# Patient Record
Sex: MALE | Race: WHITE | NOT HISPANIC OR LATINO | URBAN - METROPOLITAN AREA
[De-identification: names, ages, dates, MRNs, and addresses within clinical notes are randomized per-mention and may not be internally consistent; named-entity substitution may affect disease eponyms.]

---

## 2017-03-22 ENCOUNTER — OUTPATIENT (OUTPATIENT)
Dept: OUTPATIENT SERVICES | Facility: HOSPITAL | Age: 57
LOS: 1 days | End: 2017-03-22
Payer: MEDICARE

## 2017-03-22 DIAGNOSIS — Z98.1 ARTHRODESIS STATUS: Chronic | ICD-10-CM

## 2017-03-22 DIAGNOSIS — Z98.89 OTHER SPECIFIED POSTPROCEDURAL STATES: Chronic | ICD-10-CM

## 2017-03-22 DIAGNOSIS — R06.09 OTHER FORMS OF DYSPNEA: ICD-10-CM

## 2017-03-22 DIAGNOSIS — M54.9 DORSALGIA, UNSPECIFIED: Chronic | ICD-10-CM

## 2017-03-22 DIAGNOSIS — R68.89 OTHER GENERAL SYMPTOMS AND SIGNS: ICD-10-CM

## 2017-03-22 PROCEDURE — 78452 HT MUSCLE IMAGE SPECT MULT: CPT | Mod: 26

## 2017-03-22 PROCEDURE — A9500: CPT

## 2017-03-22 PROCEDURE — 93018 CV STRESS TEST I&R ONLY: CPT

## 2017-03-22 PROCEDURE — 93017 CV STRESS TEST TRACING ONLY: CPT

## 2017-03-22 PROCEDURE — 78452 HT MUSCLE IMAGE SPECT MULT: CPT

## 2017-03-22 PROCEDURE — 93016 CV STRESS TEST SUPVJ ONLY: CPT

## 2017-03-23 DIAGNOSIS — R06.09 OTHER FORMS OF DYSPNEA: ICD-10-CM

## 2019-10-25 ENCOUNTER — EMERGENCY (EMERGENCY)
Facility: HOSPITAL | Age: 59
LOS: 1 days | Discharge: HOME | End: 2019-10-25
Attending: EMERGENCY MEDICINE
Payer: COMMERCIAL

## 2019-10-25 VITALS
SYSTOLIC BLOOD PRESSURE: 189 MMHG | HEART RATE: 63 BPM | OXYGEN SATURATION: 98 % | DIASTOLIC BLOOD PRESSURE: 84 MMHG | RESPIRATION RATE: 18 BRPM

## 2019-10-25 VITALS
RESPIRATION RATE: 16 BRPM | HEART RATE: 77 BPM | SYSTOLIC BLOOD PRESSURE: 205 MMHG | DIASTOLIC BLOOD PRESSURE: 102 MMHG | HEIGHT: 68 IN | WEIGHT: 214.95 LBS | OXYGEN SATURATION: 94 % | TEMPERATURE: 98 F

## 2019-10-25 DIAGNOSIS — M54.9 DORSALGIA, UNSPECIFIED: Chronic | ICD-10-CM

## 2019-10-25 DIAGNOSIS — Z98.89 OTHER SPECIFIED POSTPROCEDURAL STATES: Chronic | ICD-10-CM

## 2019-10-25 DIAGNOSIS — Z98.1 ARTHRODESIS STATUS: Chronic | ICD-10-CM

## 2019-10-25 LAB
ALBUMIN SERPL ELPH-MCNC: 3.9 G/DL — SIGNIFICANT CHANGE UP (ref 3.3–5.2)
ALP SERPL-CCNC: 94 U/L — SIGNIFICANT CHANGE UP (ref 40–120)
ALT FLD-CCNC: 23 U/L — SIGNIFICANT CHANGE UP
ANION GAP SERPL CALC-SCNC: 12 MMOL/L — SIGNIFICANT CHANGE UP (ref 5–17)
APPEARANCE UR: CLEAR — SIGNIFICANT CHANGE UP
APTT BLD: 32.7 SEC — SIGNIFICANT CHANGE UP (ref 27.5–36.3)
AST SERPL-CCNC: 14 U/L — SIGNIFICANT CHANGE UP
BACTERIA # UR AUTO: SIGNIFICANT CHANGE UP
BASOPHILS # BLD AUTO: 0.05 K/UL — SIGNIFICANT CHANGE UP (ref 0–0.2)
BASOPHILS NFR BLD AUTO: 0.4 % — SIGNIFICANT CHANGE UP (ref 0–2)
BILIRUB SERPL-MCNC: 0.3 MG/DL — LOW (ref 0.4–2)
BILIRUB UR-MCNC: NEGATIVE — SIGNIFICANT CHANGE UP
BUN SERPL-MCNC: 11 MG/DL — SIGNIFICANT CHANGE UP (ref 8–20)
CALCIUM SERPL-MCNC: 9.9 MG/DL — SIGNIFICANT CHANGE UP (ref 8.6–10.2)
CHLORIDE SERPL-SCNC: 100 MMOL/L — SIGNIFICANT CHANGE UP (ref 98–107)
CO2 SERPL-SCNC: 27 MMOL/L — SIGNIFICANT CHANGE UP (ref 22–29)
COLOR SPEC: YELLOW — SIGNIFICANT CHANGE UP
CREAT SERPL-MCNC: 0.46 MG/DL — LOW (ref 0.5–1.3)
DIFF PNL FLD: NEGATIVE — SIGNIFICANT CHANGE UP
EOSINOPHIL # BLD AUTO: 0.04 K/UL — SIGNIFICANT CHANGE UP (ref 0–0.5)
EOSINOPHIL NFR BLD AUTO: 0.3 % — SIGNIFICANT CHANGE UP (ref 0–6)
EPI CELLS # UR: NEGATIVE — SIGNIFICANT CHANGE UP
FLU A RESULT: SIGNIFICANT CHANGE UP
FLU A RESULT: SIGNIFICANT CHANGE UP
FLUAV AG NPH QL: SIGNIFICANT CHANGE UP
FLUBV AG NPH QL: SIGNIFICANT CHANGE UP
GLUCOSE SERPL-MCNC: 136 MG/DL — HIGH (ref 70–115)
GLUCOSE UR QL: NEGATIVE MG/DL — SIGNIFICANT CHANGE UP
HCT VFR BLD CALC: 43.9 % — SIGNIFICANT CHANGE UP (ref 39–50)
HGB BLD-MCNC: 14 G/DL — SIGNIFICANT CHANGE UP (ref 13–17)
IMM GRANULOCYTES NFR BLD AUTO: 1.3 % — SIGNIFICANT CHANGE UP (ref 0–1.5)
INR BLD: 0.96 RATIO — SIGNIFICANT CHANGE UP (ref 0.88–1.16)
KETONES UR-MCNC: NEGATIVE — SIGNIFICANT CHANGE UP
LEUKOCYTE ESTERASE UR-ACNC: NEGATIVE — SIGNIFICANT CHANGE UP
LYMPHOCYTES # BLD AUTO: 0.87 K/UL — LOW (ref 1–3.3)
LYMPHOCYTES # BLD AUTO: 7.3 % — LOW (ref 13–44)
MCHC RBC-ENTMCNC: 28.1 PG — SIGNIFICANT CHANGE UP (ref 27–34)
MCHC RBC-ENTMCNC: 31.9 GM/DL — LOW (ref 32–36)
MCV RBC AUTO: 88 FL — SIGNIFICANT CHANGE UP (ref 80–100)
MONOCYTES # BLD AUTO: 1.28 K/UL — HIGH (ref 0–0.9)
MONOCYTES NFR BLD AUTO: 10.7 % — SIGNIFICANT CHANGE UP (ref 2–14)
NEUTROPHILS # BLD AUTO: 9.59 K/UL — HIGH (ref 1.8–7.4)
NEUTROPHILS NFR BLD AUTO: 80 % — HIGH (ref 43–77)
NITRITE UR-MCNC: NEGATIVE — SIGNIFICANT CHANGE UP
NT-PROBNP SERPL-SCNC: 99 PG/ML — SIGNIFICANT CHANGE UP (ref 0–300)
PH UR: 6 — SIGNIFICANT CHANGE UP (ref 5–8)
PLATELET # BLD AUTO: 218 K/UL — SIGNIFICANT CHANGE UP (ref 150–400)
POTASSIUM SERPL-MCNC: 4.7 MMOL/L — SIGNIFICANT CHANGE UP (ref 3.5–5.3)
POTASSIUM SERPL-SCNC: 4.7 MMOL/L — SIGNIFICANT CHANGE UP (ref 3.5–5.3)
PROT SERPL-MCNC: 7.4 G/DL — SIGNIFICANT CHANGE UP (ref 6.6–8.7)
PROT UR-MCNC: 15 MG/DL
PROTHROM AB SERPL-ACNC: 11 SEC — SIGNIFICANT CHANGE UP (ref 10–12.9)
RBC # BLD: 4.99 M/UL — SIGNIFICANT CHANGE UP (ref 4.2–5.8)
RBC # FLD: 12.8 % — SIGNIFICANT CHANGE UP (ref 10.3–14.5)
RBC CASTS # UR COMP ASSIST: NEGATIVE /HPF — SIGNIFICANT CHANGE UP (ref 0–4)
RSV RESULT: SIGNIFICANT CHANGE UP
RSV RNA RESP QL NAA+PROBE: SIGNIFICANT CHANGE UP
SODIUM SERPL-SCNC: 139 MMOL/L — SIGNIFICANT CHANGE UP (ref 135–145)
SP GR SPEC: 1.01 — SIGNIFICANT CHANGE UP (ref 1.01–1.02)
TROPONIN T SERPL-MCNC: <0.01 NG/ML — SIGNIFICANT CHANGE UP (ref 0–0.06)
TROPONIN T SERPL-MCNC: <0.01 NG/ML — SIGNIFICANT CHANGE UP (ref 0–0.06)
UROBILINOGEN FLD QL: NEGATIVE MG/DL — SIGNIFICANT CHANGE UP
WBC # BLD: 11.98 K/UL — HIGH (ref 3.8–10.5)
WBC # FLD AUTO: 11.98 K/UL — HIGH (ref 3.8–10.5)
WBC UR QL: SIGNIFICANT CHANGE UP

## 2019-10-25 PROCEDURE — 87631 RESP VIRUS 3-5 TARGETS: CPT

## 2019-10-25 PROCEDURE — 83880 ASSAY OF NATRIURETIC PEPTIDE: CPT

## 2019-10-25 PROCEDURE — 94640 AIRWAY INHALATION TREATMENT: CPT

## 2019-10-25 PROCEDURE — 99285 EMERGENCY DEPT VISIT HI MDM: CPT | Mod: 25

## 2019-10-25 PROCEDURE — 84484 ASSAY OF TROPONIN QUANT: CPT

## 2019-10-25 PROCEDURE — 85610 PROTHROMBIN TIME: CPT

## 2019-10-25 PROCEDURE — 71046 X-RAY EXAM CHEST 2 VIEWS: CPT | Mod: 26

## 2019-10-25 PROCEDURE — 93010 ELECTROCARDIOGRAM REPORT: CPT | Mod: 76

## 2019-10-25 PROCEDURE — 36415 COLL VENOUS BLD VENIPUNCTURE: CPT

## 2019-10-25 PROCEDURE — 99285 EMERGENCY DEPT VISIT HI MDM: CPT

## 2019-10-25 PROCEDURE — 71046 X-RAY EXAM CHEST 2 VIEWS: CPT

## 2019-10-25 PROCEDURE — 93005 ELECTROCARDIOGRAM TRACING: CPT

## 2019-10-25 PROCEDURE — 80053 COMPREHEN METABOLIC PANEL: CPT

## 2019-10-25 PROCEDURE — 81001 URINALYSIS AUTO W/SCOPE: CPT

## 2019-10-25 PROCEDURE — 85027 COMPLETE CBC AUTOMATED: CPT

## 2019-10-25 PROCEDURE — 85730 THROMBOPLASTIN TIME PARTIAL: CPT

## 2019-10-25 RX ORDER — IPRATROPIUM/ALBUTEROL SULFATE 18-103MCG
3 AEROSOL WITH ADAPTER (GRAM) INHALATION ONCE
Refills: 0 | Status: COMPLETED | OUTPATIENT
Start: 2019-10-25 | End: 2019-10-25

## 2019-10-25 RX ADMIN — Medication 3 MILLILITER(S): at 12:25

## 2019-10-25 RX ADMIN — Medication 40 MILLIGRAM(S): at 12:25

## 2019-10-25 NOTE — ED PROVIDER NOTE - PSH
Back pain  Had Epidural ( August 2015 )  S/P appendectomy  at age 13 or 14  S/P cervical spinal fusion  12/9/2014  S/P knee surgery  15 years ago

## 2019-10-25 NOTE — ED PROVIDER NOTE - PATIENT PORTAL LINK FT
You can access the FollowMyHealth Patient Portal offered by [facility] by registering at the following website: [email or web address]. By joining [facility]’s FollowMyHealth portal, you will also be able to view your health information using other applications (apps) compatible with our system.

## 2019-10-25 NOTE — ED ADULT TRIAGE NOTE - CHIEF COMPLAINT QUOTE
Pt with right sided back pain and dry cough since Monday. Pt then developed left shoulder pain and chest pain since last night with SOB. Pt hypertensive in triage. No history of HTN.

## 2019-10-25 NOTE — ED PROVIDER NOTE - PROGRESS NOTE DETAILS
two trops negative; no pain; feels well at rest; with room air sats 94%; with ambulation drops to 88%; advised patient to remain for further w/u, CT chest, possible admission; however patient declines, understands all risks and benefits; hx of 50 pack yr smoker, hasn't seen a pulmonologist in many years, doesn't carry any diagnosis of COPD or the like; however, given wheezing and susceptability to URIs, suspect that could explain today's symptoms; patient insists on leaving, will f/u with PMD, will d/c with steroids, has inhaler and antibx at home.

## 2019-10-25 NOTE — ED ADULT NURSE NOTE - OBJECTIVE STATEMENT
Pt with right sided back pain and dry cough since Monday. Pt then developed left shoulder pain and chest pain since last night with SOB. Pt hypertensive in triage. No history of HTN. Denies fever . Pt completed dose of Doxycycline for upper resp infection

## 2019-10-25 NOTE — ED PROVIDER NOTE - OBJECTIVE STATEMENT
60 yo male denies PMHx, former smoker quite >10 yrs ago; p/w 1 week of not feeling well, described as upper respiratory "stuff;" cough with mild amounts of mucus; sore throat, congestion; assoc with mild dyspnea; was seen by PMD when started, given doxycycline and inhaler; last night, after coughing and then leaning over, felt assoc sudden sharp pain to right mid back/flank region, worse with movement, which also then made breathing worse; symptoms have improved this morning, but doctor recommended coming to ED; denies fever, abd pain, nausea vomiting;

## 2019-10-30 PROBLEM — Z00.00 ENCOUNTER FOR PREVENTIVE HEALTH EXAMINATION: Status: ACTIVE | Noted: 2019-10-30

## 2019-12-04 ENCOUNTER — APPOINTMENT (OUTPATIENT)
Dept: INTERNAL MEDICINE | Facility: CLINIC | Age: 59
End: 2019-12-04
Payer: MEDICARE

## 2019-12-04 ENCOUNTER — TRANSCRIPTION ENCOUNTER (OUTPATIENT)
Age: 59
End: 2019-12-04

## 2019-12-04 VITALS
DIASTOLIC BLOOD PRESSURE: 72 MMHG | WEIGHT: 233 LBS | RESPIRATION RATE: 18 BRPM | HEIGHT: 68 IN | HEART RATE: 68 BPM | BODY MASS INDEX: 35.31 KG/M2 | SYSTOLIC BLOOD PRESSURE: 146 MMHG | TEMPERATURE: 98.3 F | OXYGEN SATURATION: 94 %

## 2019-12-04 DIAGNOSIS — M54.5 LOW BACK PAIN: ICD-10-CM

## 2019-12-04 DIAGNOSIS — G89.29 LOW BACK PAIN: ICD-10-CM

## 2019-12-04 PROCEDURE — 94729 DIFFUSING CAPACITY: CPT

## 2019-12-04 PROCEDURE — ZZZZZ: CPT

## 2019-12-04 PROCEDURE — 99204 OFFICE O/P NEW MOD 45 MIN: CPT | Mod: 25

## 2019-12-04 PROCEDURE — 94060 EVALUATION OF WHEEZING: CPT

## 2019-12-04 PROCEDURE — 99406 BEHAV CHNG SMOKING 3-10 MIN: CPT

## 2019-12-04 PROCEDURE — G0447 BEHAVIOR COUNSEL OBESITY 15M: CPT

## 2019-12-04 PROCEDURE — 94727 GAS DIL/WSHOT DETER LNG VOL: CPT

## 2019-12-04 RX ORDER — ALBUTEROL SULFATE 90 UG/1
108 (90 BASE) AEROSOL, METERED RESPIRATORY (INHALATION)
Qty: 1 | Refills: 3 | Status: ACTIVE | COMMUNITY
Start: 2019-12-04 | End: 1900-01-01

## 2019-12-04 NOTE — ASSESSMENT
[FreeTextEntry1] : #1 severe COPD. I am starting Juaquin on Spiriva one inhalation per day. Continue Proair 2 puffs q.i.d. as needed for rescue. I am scheduling him to have a 6 minute walk test and an overnight oximetry study. He will return for following pulmonary appointment in 4 months.\par \par #2 occasional cigar smoker. Patient was counseled on cessation today.  Reminded not do buy cigars going forward.\par \par #3 former cigarette smoker. Patient smoked for 40 pack years and quit 10 years ago.  I am ordering for Juqauin to have a screening CT scan of chest.\par \par #4 obesity. BMI 35.43. JUAQUIN FRANCIS was counseled on obesity and instructed on a weight reduction diet today. I recommended a low fat, low cholesterol, portion control diet, aiming to lose 2 pounds per week. Total time spent on counseling on obesity and instruction on diet was 16 minutes. \par \par RV 4 mos or prn.

## 2019-12-04 NOTE — PROCEDURE
[FreeTextEntry1] : Full pulmonary function testing today shows a severe obstructive and moderate restrictive deficit with an FEV1 of 0.92 or 28% predicted.  RV/TLC is increased consistent with air trapping. Diffusing capacity is mildly reduced

## 2019-12-04 NOTE — HISTORY OF PRESENT ILLNESS
[FreeTextEntry1] : This is he first pulmonary visit for this pleasant 59-year-old male with a history of obesity, lower back pain, status post fusion of neck and spine in past. He is referred by Dr. Kev Nam. \par \par The patient is a 40-pack-year smoker who quit 10 years ago. He smokes an occasional cigar and was counseled on stopping this altogether today. He had a respiratory illness, infection, about a month and a half ago and was treated with antibiotics, prednisone, and proair inhaler. He is improved from this, but does notice some clear sputum production, as well as dyspnea with exertion. His walking seems to be limited by his low back pain, but he says he is able to walk 2 blocks without having shortness of breath. He does note slight wheezing. He is using his rescue proair inhaler about one time last week now.\par \par He did have one week exposure at the 9/11 site and is going to the 9/11 clinic for following.\par \par He is not having recent fever or chills.

## 2019-12-04 NOTE — PHYSICAL EXAM
[General Appearance - Well Developed] : well developed [Normal Appearance] : normal appearance [Well Groomed] : well groomed [No Deformities] : no deformities [General Appearance - Well Nourished] : well nourished [General Appearance - In No Acute Distress] : no acute distress [Normal Conjunctiva] : the conjunctiva exhibited no abnormalities [Normal Oropharynx] : normal oropharynx [Eyelids - No Xanthelasma] : the eyelids demonstrated no xanthelasmas [Neck Cervical Mass (___cm)] : no neck mass was observed [Neck Appearance] : the appearance of the neck was normal [Jugular Venous Distention Increased] : there was no jugular-venous distention [Thyroid Nodule] : there were no palpable thyroid nodules [Thyroid Diffuse Enlargement] : the thyroid was not enlarged [Heart Rate And Rhythm] : heart rate and rhythm were normal [Heart Sounds] : normal S1 and S2 [Murmurs] : no murmurs present [Respiration, Rhythm And Depth] : normal respiratory rhythm and effort [Exaggerated Use Of Accessory Muscles For Inspiration] : no accessory muscle use [Auscultation Breath Sounds / Voice Sounds] : lungs were clear to auscultation bilaterally [FreeTextEntry1] : decreased aud BS's [Abdomen Tenderness] : non-tender [Abdomen Soft] : soft [Nail Clubbing] : no clubbing of the fingernails [Skin Turgor] : normal skin turgor [Cyanosis, Localized] : no localized cyanosis [No Focal Deficits] : no focal deficits [] : no rash [Affect] : the affect was normal [Impaired Insight] : insight and judgment were intact

## 2019-12-11 ENCOUNTER — APPOINTMENT (OUTPATIENT)
Dept: INTERNAL MEDICINE | Facility: CLINIC | Age: 59
End: 2019-12-11

## 2019-12-22 ENCOUNTER — FORM ENCOUNTER (OUTPATIENT)
Age: 59
End: 2019-12-22

## 2019-12-23 ENCOUNTER — APPOINTMENT (OUTPATIENT)
Dept: CT IMAGING | Facility: CLINIC | Age: 59
End: 2019-12-23
Payer: MEDICARE

## 2019-12-23 ENCOUNTER — OUTPATIENT (OUTPATIENT)
Dept: OUTPATIENT SERVICES | Facility: HOSPITAL | Age: 59
LOS: 1 days | End: 2019-12-23
Payer: COMMERCIAL

## 2019-12-23 VITALS — HEIGHT: 68 IN | BODY MASS INDEX: 34.86 KG/M2 | WEIGHT: 230 LBS

## 2019-12-23 DIAGNOSIS — Z00.8 ENCOUNTER FOR OTHER GENERAL EXAMINATION: ICD-10-CM

## 2019-12-23 DIAGNOSIS — M54.9 DORSALGIA, UNSPECIFIED: Chronic | ICD-10-CM

## 2019-12-23 DIAGNOSIS — Z98.1 ARTHRODESIS STATUS: Chronic | ICD-10-CM

## 2019-12-23 DIAGNOSIS — Z87.891 PERSONAL HISTORY OF NICOTINE DEPENDENCE: ICD-10-CM

## 2019-12-23 DIAGNOSIS — Z98.89 OTHER SPECIFIED POSTPROCEDURAL STATES: Chronic | ICD-10-CM

## 2019-12-23 PROCEDURE — G0297: CPT | Mod: 26

## 2019-12-23 PROCEDURE — G0296 VISIT TO DETERM LDCT ELIG: CPT

## 2019-12-23 PROCEDURE — G0297: CPT

## 2019-12-23 NOTE — REASON FOR VISIT
[Initial Evaluation] : an initial evaluation visit [Review of Eligibility] : review of eligibility [Low-Dose CT Screening Discussion] : low-dose CT lung cancer screening discussion [Face-to-Face Visit] : face-to-face visit

## 2019-12-23 NOTE — PLAN
[FreeTextEntry1] : - Low Dose CT chest for lung cancer screening.\par \par - Encouraged continued smoking abstinence\par \par Engaged in share decision making with Justina TITO.  Answered all questions.  He verbalized understanding and agreement.  He knows to call back with any questions or concerns.\par

## 2020-05-29 ENCOUNTER — APPOINTMENT (OUTPATIENT)
Dept: INTERNAL MEDICINE | Facility: CLINIC | Age: 60
End: 2020-05-29
Payer: MEDICARE

## 2020-05-29 DIAGNOSIS — F17.290 NICOTINE DEPENDENCE, OTHER TOBACCO PRODUCT, UNCOMPLICATED: ICD-10-CM

## 2020-05-29 PROCEDURE — 99214 OFFICE O/P EST MOD 30 MIN: CPT | Mod: 95

## 2020-05-29 RX ORDER — TIOTROPIUM BROMIDE 18 UG/1
18 CAPSULE ORAL; RESPIRATORY (INHALATION) DAILY
Qty: 30 | Refills: 3 | Status: DISCONTINUED | COMMUNITY
Start: 2019-12-04 | End: 2020-05-29

## 2020-05-29 NOTE — HISTORY OF PRESENT ILLNESS
[Home] : at home, [unfilled] , at the time of the visit. [Medical Office: ([facility])___] : at the medical office located in  [Verbal consent obtained from patient] : the patient, [unfilled] [FreeTextEntry1] : RV [de-identified] : This is a telehealth visit for this pleasant 60-year-old male with a history of COPD.  Quit smoking 12 years ago but has a rare cigar.  Counseled on cessation of cigar smoking entirely.  Did smoke cigarettes for 40 pack years.  His breathing is doing all right recently.  He is not having any coughing, wheezing, sputum production, or hemoptysis.  No recent fever or chills.\par \par He is also followed at the [facility] clinic as he had a 1 week exposure at the site.\par \par He tells me he did notice nosebleeds when he was on Spiriva and discontinued this medicine.  He will try going on Incruse Ellipta instead.  He was told to gargle after using this medicine and to call if he is having any side effects.\par \par

## 2020-05-29 NOTE — ASSESSMENT
[FreeTextEntry1] : #1.  Severe COPD.  Will begin Incruse Ellipta 1 inhalation/day.  Continue pro-air as needed for rescue.  Again counseled on discontinuing cigar smoking entirely.  At this point, he is refusing to have either a walk test or nocturnal oximetry study and tells me that he would not go on oxygen.  I did discuss with him obtaining a pulse oximeter to measure his O2 sats at home at rest and with walking and to let me know how these come out.\par \par #2  Pulmonary nodules on CT scan of chest.  Patient will have a 6-month following CT scan of chest without contrast.\par \par #3 Cigar smoking.  Counseled on cessation.  See above. \par \par #4  History of 911 exposure.  Pt continues to follow in the 911 clinic.\par \par RV 6 months.\par \par Time spent on Telehealth visit was 28 minutes.

## 2020-05-29 NOTE — PHYSICAL EXAM
[No Acute Distress] : no acute distress [Well-Appearing] : well-appearing [Normal Insight/Judgement] : insight and judgment were intact [Normal Affect] : the affect was normal

## 2020-05-29 NOTE — REVIEW OF SYSTEMS
[Negative] : Heme/Lymph [Fever] : no fever [Shortness Of Breath] : no shortness of breath [Chills] : no chills [Wheezing] : no wheezing [Cough] : no cough

## 2020-12-18 ENCOUNTER — APPOINTMENT (OUTPATIENT)
Dept: INTERNAL MEDICINE | Facility: CLINIC | Age: 60
End: 2020-12-18
Payer: COMMERCIAL

## 2020-12-18 VITALS
WEIGHT: 229 LBS | HEIGHT: 68 IN | SYSTOLIC BLOOD PRESSURE: 150 MMHG | HEART RATE: 70 BPM | TEMPERATURE: 99.2 F | BODY MASS INDEX: 34.71 KG/M2 | DIASTOLIC BLOOD PRESSURE: 102 MMHG | RESPIRATION RATE: 18 BRPM | OXYGEN SATURATION: 94 %

## 2020-12-18 DIAGNOSIS — J44.9 CHRONIC OBSTRUCTIVE PULMONARY DISEASE, UNSPECIFIED: ICD-10-CM

## 2020-12-18 DIAGNOSIS — Z20.828 CONTACT WITH AND (SUSPECTED) EXPOSURE TO OTHER VIRAL COMMUNICABLE DISEASES: ICD-10-CM

## 2020-12-18 DIAGNOSIS — R91.8 OTHER NONSPECIFIC ABNORMAL FINDING OF LUNG FIELD: ICD-10-CM

## 2020-12-18 DIAGNOSIS — R03.0 ELEVATED BLOOD-PRESSURE READING, W/OUT DIAGNOSIS OF HYPERTENSION: ICD-10-CM

## 2020-12-18 PROCEDURE — 99214 OFFICE O/P EST MOD 30 MIN: CPT | Mod: 25

## 2020-12-18 PROCEDURE — G0447 BEHAVIOR COUNSEL OBESITY 15M: CPT

## 2020-12-18 PROCEDURE — 99072 ADDL SUPL MATRL&STAF TM PHE: CPT

## 2020-12-18 RX ORDER — NAPROXEN 500 MG/1
TABLET ORAL
Refills: 0 | Status: ACTIVE | COMMUNITY

## 2020-12-18 RX ORDER — OXYCODONE HYDROCHLORIDE AND ACETAMINOPHEN 10; 325 MG/1; MG/1
10-325 TABLET ORAL
Refills: 0 | Status: ACTIVE | COMMUNITY

## 2020-12-18 NOTE — HISTORY OF PRESENT ILLNESS
[TextBox_4] : Is a 60-year-old male who comes in for a following pulmonary appointment.  He has a history of COPD and is being followed by the lung cancer screening program for pulmonary nodules.  His next CT scanning of chest will be this month.  Not having coughing, wheezing, dyspnea on exertion, or sputum production.  Noticed he is a little breathy when he goes out in cold air.  He is not having any chest pain or palpitations, no fever or chills.

## 2020-12-18 NOTE — PHYSICAL EXAM
[No Acute Distress] : no acute distress [Normal Oropharynx] : normal oropharynx [Normal Appearance] : normal appearance [No Neck Mass] : no neck mass [Normal Rate/Rhythm] : normal rate/rhythm [Normal S1, S2] : normal s1, s2 [No Murmurs] : no murmurs [No Resp Distress] : no resp distress [Clear to Auscultation Bilaterally] : clear to auscultation bilaterally [No Abnormalities] : no abnormalities [Benign] : benign [Normal Gait] : normal gait [No Clubbing] : no clubbing [No Cyanosis] : no cyanosis [No Edema] : no edema [FROM] : FROM [Normal Color/ Pigmentation] : normal color/ pigmentation [No Focal Deficits] : no focal deficits [Oriented x3] : oriented x3 [Normal Affect] : normal affect [TextBox_68] : BS's mildly decreased

## 2020-12-18 NOTE — COUNSELING
[Potential consequences of obesity discussed] : Potential consequences of obesity discussed [Benefits of weight loss discussed] : Benefits of weight loss discussed [Weigh Self Weekly] : weigh self weekly [Decrease Portions] : decrease portions [____ min/wk Activity] : [unfilled] min/wk activity [FreeTextEntry2] : healthy foods, JILLIAN [FreeTextEntry4] : 15

## 2020-12-18 NOTE — ASSESSMENT
[FreeTextEntry1] : #1  COPD.  She tells me he is no longer smoking cigars.  He will continue his program of the Incruse Ellipta and as needed rescue proair inhaler.  Scheduling for Juaquin to have full pulmonary function testing.  Return for a following pulmonary appointment in 6 months.\par \par #2  Abnormal CT scan of chest showing pulmonary nodules.  he will have a following CT scan of chest this month.  He is also followed by the lung cancer screening program.\par \par #3  Evaded blood pressure reading.  Was counseled on a strict weight reduction, healthy foods, no added salt diet and regular aerobic exercise.  To see his primary care physician in January for repeat blood pressure at that time.  Will have blood pressure readings done 2 times per week before then.

## 2020-12-22 ENCOUNTER — NON-APPOINTMENT (OUTPATIENT)
Age: 60
End: 2020-12-22

## 2020-12-22 ENCOUNTER — APPOINTMENT (OUTPATIENT)
Dept: THORACIC SURGERY | Facility: CLINIC | Age: 60
End: 2020-12-22
Payer: MEDICARE

## 2020-12-22 VITALS — HEIGHT: 68 IN | BODY MASS INDEX: 33.34 KG/M2 | WEIGHT: 220 LBS

## 2020-12-22 DIAGNOSIS — Z87.891 PERSONAL HISTORY OF NICOTINE DEPENDENCE: ICD-10-CM

## 2020-12-22 PROCEDURE — ZZZZZ: CPT

## 2020-12-22 NOTE — HISTORY OF PRESENT ILLNESS
[TextBox_13] : Referred by Dr. Steve Wynne.\par \par Mr. FRANCIS is a 60 year old male with a history of COPD and melanoma and basal cell CA of back ().\par \par He was called to review eligibility for Low-Dose CT lung cancer screening.  Reviewed and confirmed that the patient meets screening eligibility criteria:\par \par 60 years old \par \par Smoking Status: Former smoker\par \par Number of pack(s) per day: 2 ppd x 5 years, 1 ppd x 26 years\par Number of years smoked: 31\par Number of pack years smokin\par \par Number of years since quitting smokin\par Quit year: \par \par Mr. FRANCIS denies any symptoms of lung cancer, including new cough, change in cough, hemoptysis, and unintentional weight loss.\par \par Mr. FRANCIS denies any personal history of lung cancer.  No lung cancer in a first degree relative.

## 2020-12-28 ENCOUNTER — APPOINTMENT (OUTPATIENT)
Dept: CT IMAGING | Facility: CLINIC | Age: 60
End: 2020-12-28
Payer: MEDICARE

## 2020-12-28 ENCOUNTER — OUTPATIENT (OUTPATIENT)
Dept: OUTPATIENT SERVICES | Facility: HOSPITAL | Age: 60
LOS: 1 days | End: 2020-12-28
Payer: COMMERCIAL

## 2020-12-28 ENCOUNTER — RESULT REVIEW (OUTPATIENT)
Age: 60
End: 2020-12-28

## 2020-12-28 DIAGNOSIS — Z98.89 OTHER SPECIFIED POSTPROCEDURAL STATES: Chronic | ICD-10-CM

## 2020-12-28 DIAGNOSIS — Z00.8 ENCOUNTER FOR OTHER GENERAL EXAMINATION: ICD-10-CM

## 2020-12-28 DIAGNOSIS — Z98.1 ARTHRODESIS STATUS: Chronic | ICD-10-CM

## 2020-12-28 DIAGNOSIS — M54.9 DORSALGIA, UNSPECIFIED: Chronic | ICD-10-CM

## 2020-12-28 PROCEDURE — G0297: CPT | Mod: 26

## 2020-12-28 PROCEDURE — 71271 CT THORAX LUNG CANCER SCR C-: CPT

## 2020-12-29 DIAGNOSIS — R09.81 NASAL CONGESTION: ICD-10-CM

## 2020-12-29 RX ORDER — FLUTICASONE PROPIONATE 50 UG/1
50 SPRAY, METERED NASAL DAILY
Qty: 1 | Refills: 2 | Status: ACTIVE | COMMUNITY
Start: 2020-12-29 | End: 1900-01-01

## 2020-12-30 ENCOUNTER — NON-APPOINTMENT (OUTPATIENT)
Age: 60
End: 2020-12-30

## 2020-12-31 ENCOUNTER — TRANSCRIPTION ENCOUNTER (OUTPATIENT)
Age: 60
End: 2020-12-31

## 2021-01-04 ENCOUNTER — APPOINTMENT (OUTPATIENT)
Dept: INTERNAL MEDICINE | Facility: CLINIC | Age: 61
End: 2021-01-04
Payer: COMMERCIAL

## 2021-01-04 VITALS
RESPIRATION RATE: 18 BRPM | BODY MASS INDEX: 34.86 KG/M2 | HEART RATE: 77 BPM | HEIGHT: 68 IN | SYSTOLIC BLOOD PRESSURE: 168 MMHG | DIASTOLIC BLOOD PRESSURE: 82 MMHG | OXYGEN SATURATION: 95 % | TEMPERATURE: 98.1 F | WEIGHT: 230 LBS

## 2021-01-04 PROCEDURE — 94010 BREATHING CAPACITY TEST: CPT

## 2021-01-04 PROCEDURE — 94727 GAS DIL/WSHOT DETER LNG VOL: CPT

## 2021-01-04 PROCEDURE — 94729 DIFFUSING CAPACITY: CPT

## 2021-01-11 RX ORDER — UMECLIDINIUM 62.5 UG/1
62.5 AEROSOL, POWDER ORAL DAILY
Qty: 1 | Refills: 0 | Status: ACTIVE | COMMUNITY
Start: 2020-05-29 | End: 1900-01-01

## 2021-01-18 ENCOUNTER — TRANSCRIPTION ENCOUNTER (OUTPATIENT)
Age: 61
End: 2021-01-18

## 2021-06-07 ENCOUNTER — APPOINTMENT (OUTPATIENT)
Dept: SURGERY | Facility: CLINIC | Age: 61
End: 2021-06-07
Payer: MEDICARE

## 2021-06-07 VITALS
OXYGEN SATURATION: 94 % | BODY MASS INDEX: 35.72 KG/M2 | WEIGHT: 217 LBS | TEMPERATURE: 97.6 F | HEART RATE: 67 BPM | DIASTOLIC BLOOD PRESSURE: 84 MMHG | SYSTOLIC BLOOD PRESSURE: 157 MMHG | HEIGHT: 65.5 IN

## 2021-06-07 VITALS — DIASTOLIC BLOOD PRESSURE: 78 MMHG | SYSTOLIC BLOOD PRESSURE: 144 MMHG

## 2021-06-07 DIAGNOSIS — E66.9 OBESITY, UNSPECIFIED: ICD-10-CM

## 2021-06-07 DIAGNOSIS — K42.9 UMBILICAL HERNIA W/OUT OBSTRUCTION OR GANGRENE: ICD-10-CM

## 2021-06-07 DIAGNOSIS — M62.08 SEPARATION OF MUSCLE (NONTRAUMATIC), OTHER SITE: ICD-10-CM

## 2021-06-07 PROCEDURE — 99203 OFFICE O/P NEW LOW 30 MIN: CPT

## 2021-06-07 NOTE — CONSULT LETTER
[Dear  ___] : Dear  [unfilled], [Consult Letter:] : I had the pleasure of evaluating your patient, [unfilled]. [Please see my note below.] : Please see my note below. [Consult Closing:] : Thank you very much for allowing me to participate in the care of this patient.  If you have any questions, please do not hesitate to contact me. [Sincerely,] : Sincerely, [FreeTextEntry3] : Ad Jiang MD, FACS\par  \par Department of Surgery\par [facility]\par [facility]\par

## 2021-06-07 NOTE — ASSESSMENT
[FreeTextEntry1] : The patient is an obese 61 year old male with a rectus diastasis of the upper midline and reducible umbilical hernia.  The patient has been advised that he will benefit from weight loss prior to repair of the umbilical hernia.  The weight loss will also potentially decrease the likelihood that the diastasis will progress.  The risks, benefits, and alternatives including the option of doing nothing to an open umbilical hernia repair with possible mesh were discussed.  The potential complications including but not limited to infection, bleeding, hernia recurrence, chronic post-operative pain, and seroma formation were discussed.  The patient was educated regarding the signs and symptoms of hernia strangulation and advised to seek immediate MD evaluation should this occur.  The patient understands and wishes to proceed once he loses weight in preparation. A total of 40 minutes was spent coordinating the patient's care.

## 2021-06-07 NOTE — PHYSICAL EXAM
[JVD] : no jugular venous distention  [Abdominal Masses] : No abdominal masses [Abdomen Tenderness] : ~T ~M No abdominal tenderness [No Rash or Lesion] : No rash or lesion [Purpura] : no purpura  [Petechiae] : no petechiae [Skin Ulcer] : no ulcer [Skin Induration] : no induration [Alert] : alert [Oriented to Person] : oriented to person [Oriented to Place] : oriented to place [Oriented to Time] : oriented to time [Calm] : calm [de-identified] : non toxic, in no acute distress  [de-identified] : NC/AT PERRL EOMI no scleral icterus  [de-identified] : trachea midline, no gross mass  [de-identified] : no audible wheezing or stridor  [de-identified] : protuberant, obese, no localizing tenderness, no guarding, no rebound, no masses  [de-identified] : FROM of all extremities with no gross deformity or angulation ,there is a rectus diastasis of the upper midline, there is a reducible umbilical hernia without tenderness  [de-identified] : mood is calm

## 2021-06-07 NOTE — HISTORY OF PRESENT ILLNESS
[de-identified] : The patient comes to the office in consultation by Dr. Thuy Angulo for evaluation of a bulge in the upper midline and a lump that pops out of his belly button.  The patient is without abdominal pain, he does admit to abdominal tightness.  He has been trying to lose weight but reports that he recently has gained a few more pounds.  With exertion the lump in the belly button pops out further and causes a burning pain. He reports that it has been present for greater than a year.

## 2021-06-21 DIAGNOSIS — Z01.818 ENCOUNTER FOR OTHER PREPROCEDURAL EXAMINATION: ICD-10-CM

## 2021-06-22 ENCOUNTER — APPOINTMENT (OUTPATIENT)
Dept: DISASTER EMERGENCY | Facility: CLINIC | Age: 61
End: 2021-06-22

## 2021-07-12 ENCOUNTER — APPOINTMENT (OUTPATIENT)
Dept: SURGERY | Facility: CLINIC | Age: 61
End: 2021-07-12

## 2021-07-15 ENCOUNTER — TRANSCRIPTION ENCOUNTER (OUTPATIENT)
Age: 61
End: 2021-07-15

## 2021-07-16 ENCOUNTER — OUTPATIENT (OUTPATIENT)
Dept: OUTPATIENT SERVICES | Facility: HOSPITAL | Age: 61
LOS: 1 days | End: 2021-07-16
Payer: COMMERCIAL

## 2021-07-16 DIAGNOSIS — Z98.89 OTHER SPECIFIED POSTPROCEDURAL STATES: Chronic | ICD-10-CM

## 2021-07-16 DIAGNOSIS — M54.9 DORSALGIA, UNSPECIFIED: Chronic | ICD-10-CM

## 2021-07-16 DIAGNOSIS — M54.12 RADICULOPATHY, CERVICAL REGION: ICD-10-CM

## 2021-07-16 DIAGNOSIS — Z98.1 ARTHRODESIS STATUS: Chronic | ICD-10-CM

## 2021-07-16 PROCEDURE — 64491 INJ PARAVERT F JNT C/T 2 LEV: CPT | Mod: RT,XS

## 2021-07-16 PROCEDURE — 64490 INJ PARAVERT F JNT C/T 1 LEV: CPT | Mod: 50

## 2021-07-16 PROCEDURE — 76000 FLUOROSCOPY <1 HR PHYS/QHP: CPT

## 2022-01-06 NOTE — ED ADULT NURSE NOTE - GASTROINTESTINAL ASSESSMENT
WDL O-T Advancement Flap Text: The defect edges were debeveled with a #15c scalpel blade.  Given the location of the defect, shape of the defect and the proximity to free margins an O-T advancement flap was deemed most appropriate.  Using a sterile surgical marker, an appropriate advancement flap was drawn incorporating the defect and placing the expected incisions within the relaxed skin tension lines where possible.    The area thus outlined was incised deep to adipose tissue with a #15 scalpel blade.  The skin margins were undermined to an appropriate distance in all directions utilizing iris scissors.

## 2022-12-05 ENCOUNTER — APPOINTMENT (OUTPATIENT)
Dept: NEUROSURGERY | Facility: CLINIC | Age: 62
End: 2022-12-05

## 2022-12-05 ENCOUNTER — APPOINTMENT (OUTPATIENT)
Dept: VASCULAR SURGERY | Facility: CLINIC | Age: 62
End: 2022-12-05

## 2022-12-05 ENCOUNTER — NON-APPOINTMENT (OUTPATIENT)
Age: 62
End: 2022-12-05

## 2022-12-05 ENCOUNTER — APPOINTMENT (OUTPATIENT)
Dept: VASCULAR SURGERY | Facility: CLINIC | Age: 62
End: 2022-12-05
Payer: MEDICARE

## 2022-12-05 VITALS
HEART RATE: 73 BPM | BODY MASS INDEX: 34.1 KG/M2 | OXYGEN SATURATION: 96 % | SYSTOLIC BLOOD PRESSURE: 135 MMHG | DIASTOLIC BLOOD PRESSURE: 75 MMHG | WEIGHT: 225 LBS | HEIGHT: 68 IN | TEMPERATURE: 98.2 F

## 2022-12-05 VITALS
SYSTOLIC BLOOD PRESSURE: 118 MMHG | HEART RATE: 74 BPM | BODY MASS INDEX: 37.65 KG/M2 | TEMPERATURE: 96.6 F | WEIGHT: 226 LBS | RESPIRATION RATE: 16 BRPM | HEIGHT: 65 IN | DIASTOLIC BLOOD PRESSURE: 80 MMHG | OXYGEN SATURATION: 96 %

## 2022-12-05 DIAGNOSIS — Z98.1 ARTHRODESIS STATUS: ICD-10-CM

## 2022-12-05 DIAGNOSIS — Z87.39 PERSONAL HISTORY OF OTHER DISEASES OF THE MUSCULOSKELETAL SYSTEM AND CONNECTIVE TISSUE: ICD-10-CM

## 2022-12-05 DIAGNOSIS — Z86.79 PERSONAL HISTORY OF OTHER DISEASES OF THE CIRCULATORY SYSTEM: ICD-10-CM

## 2022-12-05 DIAGNOSIS — Z87.09 PERSONAL HISTORY OF OTHER DISEASES OF THE RESPIRATORY SYSTEM: ICD-10-CM

## 2022-12-05 DIAGNOSIS — I71.40 ABDOMINAL AORTIC ANEURYSM, WITHOUT RUPTURE, UNSPECIFIED: ICD-10-CM

## 2022-12-05 DIAGNOSIS — E78.5 HYPERLIPIDEMIA, UNSPECIFIED: ICD-10-CM

## 2022-12-05 DIAGNOSIS — M47.814 SPONDYLOSIS W/OUT MYELOPATHY OR RADICULOPATHY, THORACIC REGION: ICD-10-CM

## 2022-12-05 PROCEDURE — 93978 VASCULAR STUDY: CPT

## 2022-12-05 PROCEDURE — 99213 OFFICE O/P EST LOW 20 MIN: CPT

## 2022-12-05 PROCEDURE — 99203 OFFICE O/P NEW LOW 30 MIN: CPT

## 2022-12-05 RX ORDER — LOSARTAN POTASSIUM 50 MG/1
50 TABLET, FILM COATED ORAL
Refills: 0 | Status: ACTIVE | COMMUNITY

## 2022-12-05 RX ORDER — CLOPIDOGREL BISULFATE 75 MG/1
75 TABLET, FILM COATED ORAL
Qty: 90 | Refills: 0 | Status: ACTIVE | COMMUNITY
Start: 2022-08-22

## 2022-12-05 RX ORDER — TRAZODONE HYDROCHLORIDE 50 MG/1
50 TABLET ORAL
Qty: 90 | Refills: 0 | Status: ACTIVE | COMMUNITY
Start: 2022-04-13

## 2022-12-05 RX ORDER — ATORVASTATIN CALCIUM 40 MG/1
40 TABLET, FILM COATED ORAL
Qty: 30 | Refills: 0 | Status: ACTIVE | COMMUNITY
Start: 2022-08-22

## 2022-12-05 RX ORDER — METFORMIN HYDROCHLORIDE 500 MG/1
500 TABLET, COATED ORAL
Qty: 90 | Refills: 0 | Status: ACTIVE | COMMUNITY
Start: 2022-09-27

## 2022-12-05 RX ORDER — METOPROLOL SUCCINATE 25 MG/1
25 TABLET, EXTENDED RELEASE ORAL
Qty: 30 | Refills: 0 | Status: ACTIVE | COMMUNITY
Start: 2022-08-23

## 2022-12-05 NOTE — HISTORY OF PRESENT ILLNESS
[FreeTextEntry1] : 62M with PMH tobacco use, CAD s/p PCI x1, COPD, HTN, obesity, chronic back pain presents to clinic for evaluation of AAA found incidentally on spinal imaging. Patient reports approximately 20 years of tobacco use ending in 2008, with max of 2 PPD. Occasional EtOH use. Patient has no previous knowledge of existence of aneurysm and denies symptoms related to such. He denies known family history of aneurysms or connective tissue disorders.

## 2022-12-05 NOTE — ASSESSMENT
[FreeTextEntry1] : 62M presenting with incidental finding of 3.5cm AAA found on recent spinal imaging found to be 3.4 cm suprarenal AAA on in-office vascular study. Asymptomatic at this time. [Aneurysm Surgery] : aneurysm surgery

## 2022-12-05 NOTE — REASON FOR VISIT
[Initial Eval - Existing Diagnosis] : an initial evaluation of an existing diagnosis [FreeTextEntry1] : AAA

## 2022-12-05 NOTE — PHYSICAL EXAM
[Respiratory Effort] : normal respiratory effort [Normal Rate and Rhythm] : normal rate and rhythm [Alert] : alert [Oriented to Person] : oriented to person [Oriented to Place] : oriented to place [Oriented to Time] : oriented to time [Calm] : calm [JVD] : no jugular venous distention  [2+] : left 2+ [Ankle Swelling (On Exam)] : not present [Varicose Veins Of Lower Extremities] : not present [] : not present [Abdomen Masses] : No abdominal masses [Abdomen Tenderness] : ~T ~M No abdominal tenderness [Purpura] : no purpura  [Petechiae] : no petechiae [de-identified] : resting comfortably, NAD [de-identified] : PERRL [de-identified] : supple, stiff [de-identified] : deferred [de-identified] : FROM of all 4 extremities\par

## 2022-12-08 PROBLEM — Z98.1 HISTORY OF LUMBAR SPINAL FUSION: Status: ACTIVE | Noted: 2022-12-08

## 2022-12-08 PROBLEM — M47.814 DEGENERATIVE JOINT DISEASE OF THORACIC SPINE: Status: ACTIVE | Noted: 2022-12-05

## 2022-12-08 NOTE — HISTORY OF PRESENT ILLNESS
[de-identified] : NANI FRANCIS is a 62 year old male presents for initial neurosurgical evaluation of cervical and lumbar spinal stenosis.  Past medical history includes COPD and AAA. \par Past surgical history includes L5- S1 PLIF 2018.  Denies any trauma or injury.  \par Patient mentions his lower back pain starts in a band like distribution with radiation to bilateral lower extremities. L > R .  LBP > LE pain.  Pain intensity 7/10.   The pain is described as intermittent in nature with numbness/tingling and burning.   Patient has diminished sensation to the lower extremities as well.  He is ambulating independently with an antalgic gait.   Patient is unable to bending and twisting. Patient has not been able to tolerate physical therapy.  No pain management injections.   He is managing his symptoms with NSAIDS with little relief.  \par  [Pain] : pain [Weakness] : weakness [Numbness] : numbness [Walking] : walking [Bending] : worsened by bending [Lifting] : worsened by lifting [Recumbency] : relieved by recumbency [Rest] : relieved by rest [Ataxia] : no ataxia [Incontinence] : no incontinence [Loss of Dexterity] : good dexterity [Urinary Ret.] : no urinary retention

## 2022-12-08 NOTE — REASON FOR VISIT
[New Patient Visit] : a new patient visit [Referred By: _________] : Patient was referred by BRANDEN [FreeTextEntry1] : THORACIC AND LUMBAR REGION PAIN

## 2022-12-08 NOTE — ASSESSMENT
[FreeTextEntry1] : Mr. Saenz presents with above history and imaging.  He is neurologically intact.  I have personally reviewed his CT thoracic and lumbar spine that reveals evidence of DDD and would like to obtain an MRI thoracic and lumbar spine to assess for adjacent level disease and progression of spinal stenosis. \par Patient should follow up after imaging.\par I have encouraged him to participate in PT but he states it exacerbates his pain. \par Patient has been given an opportunity to ask and have their questions answered to their satisfaction.\par Patient knows to call the office if there are any new or worsening symptoms.\par \par \par \par I, Dr. Angel Hartman, personally performed the evaluation and management (E/M) services for this patient.  That E/M includes assessment of the initial examination, assessing the pertinent medical and surgical history, and establishing the plan of care.  At the time of the visit, my ACP, Christy Reich, actively participated in the evaluation and management services for this patient to be followed going forward in accordance with the plan documented in the clinical note.\par \par \par

## 2022-12-08 NOTE — DATA REVIEWED
[de-identified] : EXAM:  CT THORACIC SPINE WITHOUT CONTRAST\par \par Note - This patient has received 0 CT studies and 0 Myocardial Perfusion studies within our network over the previous 12 month period.\par \par HISTORY:  Thoracic spine pain. \par \par TECHNIQUE: High resolution stacked axial imaging of thoracic spine followed by coronal and sagittal reformations, without contrast. One or more of the following dose reduction techniques were used: automated exposure control, adjustment of the mA and/or kV according to patient size, use of iterative reconstruction technique. \par \par COMPARISON: None.\par \par FINDINGS: Exaggeration of kyphosis. Chronic anterior wedging of T5 followed by T4 bodies. Chronic Schmorl's nodes project through the central endplates across BOTH sides of the T7-T8 endplates. Chronic Schmorl's nodes also project through the central inferior endplates of T11, T12 and less prominent central endplate depressions are present at T8-T9 through T10-T11. There is a sclerotic focus in the T11 T11 body consistent with a bone island and a small 1 within the T4 body. There is no obvious acute compression fracture.\par \par There is focal ankylosis of the facet joints from T2-T3 to T5-T6, and near total ankylosis of the remaining thoracic levels.\par \par Level by level evaluation shows no significant disc herniation, central osseous canal foraminal stenosis or significant osseous foraminal stenosis at any thoracic level. Paravertebral soft tissues are unremarkable. Postoperative changes of the cervical spine are noted.\par \par IMPRESSION: \par 1.  Exaggeration of kyphosis, ankylosis of T2-T3 through T5-T6 facet joints, and a chronic endplate degenerative changes as detailed.\par 2.  No significant disc herniation, central canal stenosis or significant foraminal stenosis at any thoracic level.  \par 	\par EXAM:  CT LUMBAR SPINE WITHOUT CONTRAST\par \par Note - This patient has received 0 CT studies and 0 Myocardial Perfusion studies within our network over the previous 12 month period.\par \par HISTORY: Low back pain.\par \par TECHNIQUE:  CT was performed without contrast with axial multislice multidetector technique. Sagittal, coronal and axial reformatted images were then obtained. One or more of the following dose reduction techniques were used: automated exposure control, adjustment of the mA and/or kV according to patient size, use of iterative reconstruction technique. \par \par COMPARISON: 11/18/2022 CT thoracic spine performed concurrently.\par \par FINDINGS: Diffuse osteopenia. Mild dextroscoliosis. Surgical fusion across L5-S1. Shallow Schmorl's nodes project through the central endplates across BOTH sides of the L4-L5 followed by L3-L4 and T12-L1 including T11-T12 levels. Irregular bone island again demonstrated in the T11 body has described on the accompanying CT thoracic spine report performed concurrently.\par \par L1-L2, no canal or foraminal stenosis. No significant disc bulge or significant neural foraminal narrowing. Ankylosis of BOTH facet joints, greater on LEFT. No significant ligamentum flavum thickening. No nerve root compression.\par \par \par L2-L3, no canal or foraminal stenosis. No significant disc bulge or significant neural foraminal narrowing. No significant facet arthropathy. No significant ligamentum flavum thickening. No nerve root compression.\par \par L3-L4, no canal or foraminal stenosis. No significant disc bulge or significant neural foraminal narrowing. No significant facet arthropathy. No significant ligamentum flavum thickening. No nerve root compression. Bone-on-bone contact of the interspinous processes.\par \par \par L4-L5, moderate RIGHT, mild LEFT foraminal stenosis primarily from facet hypertrophy. Canal is patent. Incomplete or healed nondisplaced LEFT L4 pars fracture. Bone-on-bone contact of the interspinous processes.\par \par L5-S1, dorsal decompression, posterior fusion, no evidence of hardware loosening, or hardware fracture. Foramina are patent. Bone-on-bone contact of the interspinous processes.\par \par Paravertebral soft tissues demonstrate a 3.5 cm infrarenal abdominal aortic aneurysm.. Calcific atherosclerosis of abdominal aorta and its major branches.\par \par IMPRESSION: \par 1.  Incomplete or healed nondisplaced LEFT L4 pars fracture.\par 2.  L4-L5 moderate RIGHT, mild LEFT foraminal stenosis from facet hypertrophy.\par 3.  Ankylosis of L1-L2 facet joints, greater on LEFT.\par 4.  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  Stable L5-S1 posterior fusion without hardware loosening.\par 6.  3.5 cm AAA. Recommend follow-up abdominal ultrasound and CTA aorta if clinically indicated. Remaining study unremarkable.\par

## 2022-12-21 ENCOUNTER — APPOINTMENT (OUTPATIENT)
Dept: VASCULAR SURGERY | Facility: CLINIC | Age: 62
End: 2022-12-21

## 2023-05-02 ENCOUNTER — APPOINTMENT (OUTPATIENT)
Dept: ORTHOPEDIC SURGERY | Facility: CLINIC | Age: 63
End: 2023-05-02
Payer: MEDICARE

## 2023-05-02 VITALS
DIASTOLIC BLOOD PRESSURE: 79 MMHG | SYSTOLIC BLOOD PRESSURE: 117 MMHG | WEIGHT: 226 LBS | HEART RATE: 72 BPM | HEIGHT: 65 IN | BODY MASS INDEX: 37.65 KG/M2

## 2023-05-02 DIAGNOSIS — M79.641 PAIN IN RIGHT HAND: ICD-10-CM

## 2023-05-02 DIAGNOSIS — G56.21 LESION OF ULNAR NERVE, RIGHT UPPER LIMB: ICD-10-CM

## 2023-05-02 DIAGNOSIS — R20.2 PARESTHESIA OF SKIN: ICD-10-CM

## 2023-05-02 DIAGNOSIS — M25.531 PAIN IN RIGHT WRIST: ICD-10-CM

## 2023-05-02 PROCEDURE — 73110 X-RAY EXAM OF WRIST: CPT | Mod: RT

## 2023-05-02 PROCEDURE — 99204 OFFICE O/P NEW MOD 45 MIN: CPT

## 2023-05-04 NOTE — PHYSICAL EXAM
[Normal Finger/nose] : finger to nose coordination [Normal] : no peripheral adenopathy appreciated [de-identified] : Right Hand/Wrist Exam\par \par Skin: Intact with no erythema, no ecchymosis, no gross deformities. No evidence of muscle atrophy of hand.\par Exam: Mild ulnar fovea TTP, no SL TTP, no TTP of snuffbox, bony distal radius TTP or bony distal ulna TTP. Negative Mccain's test, no DRUJ instability, no ECU subluxation. Negative Thumb Grind Test. Negative Finkelstein's Test.\par ROM: 60 degrees of flexion, 60 degrees of extension. Full pronation and supination with pain on pronation.\par Neuro: Sensation is grossly intact without any deficits.  strength is 5/5. Negative Tinel's at the Wrist, but positive at the Elbow. Negative Phalen's at the wrist.\par Vasc: Distal Pulses 2+ and capillary refill is brisk. Negative Rob Test\par  [de-identified] : [facility] [de-identified] : 3 xray views of the right hand and wrist were obtained.\par \par -No fractures or dislocations\par -Mild Distal Radiocarpal arthritis noted\par -Normal ulnar variance

## 2023-05-04 NOTE — HISTORY OF PRESENT ILLNESS
[FreeTextEntry1] : 05/02/2023: Patient is a 63 year-old, right-hand-dominant male who presents to the office today for initial evaluation of right hand and wrist pain. Pain has been present for the past 4 weeks. He notes that he had back surgery on 03/23/2023 and began having pain in the ulnar aspect of the wrist and hand about 1 week later. He denies any injury. Pain is radiating into the small finger and is associated with numbness of the entire digit. Symptoms are constant and worse at night time. He takes Percocet for his back, which does help his back symptoms, but has not alleviated the hand/wrist symptoms. He presents today for further treatment options.\par \par No bleeding, fever, chills, sweats, nausea or vomiting were endorsed at this visit. The above history is in addition to the intake form, which I personally reviewed at length, including the patient's medical, surgical, and family history. The patient's allergies were also carefully reviewed. In addition, the family and social history of the patient were also reviewed, which are non-contributory to this visit unless specified above. All of this has been documented accordingly in the visit note.\par \par

## 2023-05-04 NOTE — ASSESSMENT
[FreeTextEntry1] : Patient presents with right hand and wrist pain. Their symptoms are consistent with possible ulnar nerve entrapment. The nature of this condition was described at length with the patient and they verbalized understanding. \par \par Recommendations: \par -EMG to assess the integrity of the ulnar nerve\par -Heel-tomasa brace for comfort\par -We discussed ergonomic positioning of the affected arm and how it relates to the severity and exacerbation of his symptoms. Finding comfortable positions in conjunction with the Heel-tomasa brace, may improve symptoms in the setting of his inability to utilize anti-inflammatory medications. \par -Would prefer to start Diclofenac for an anti-inflammatory, however, use of this medication is contraindicated due to his stated concurrent use of Eliquis s/p cardiac stent. (Appears in the patient's reviewed history, that Plavix is also in his daily medication regimen) Patient should utilize the Percocet, that is prescribed for him by his pain management provider, for his back, PER THE PRESCRIBER'S INSTRUCTIONS ONLY, and no more than that. If the Percocet contains Acetaminophen, he should not take any additional over-the-counter Tylenol/Acetaminophen products. If his Oxycodone prescription does not contain Acetaminophen, he may add doses of over-the-counter Tylenol every six hours to his pain regimen (not to exceed 3900mg daily), per instructions on the bottle \par -Followup with Dr. Greenwood after EMG to discuss results and further treatment options. This was discussed with the patient to his verbal understanding.\par -Patient was given ample time to ask questions and all questions were answered to the patient's full satisfaction.\par \par The patient was in full agreement with this plan and appreciative of their care.\par

## 2023-05-07 PROBLEM — M25.531 RIGHT WRIST PAIN: Status: ACTIVE | Noted: 2023-05-02

## 2023-05-12 ENCOUNTER — APPOINTMENT (OUTPATIENT)
Dept: PHYSICAL MEDICINE AND REHAB | Facility: CLINIC | Age: 63
End: 2023-05-12
Payer: MEDICARE

## 2023-05-12 VITALS
RESPIRATION RATE: 16 BRPM | HEART RATE: 71 BPM | WEIGHT: 215 LBS | SYSTOLIC BLOOD PRESSURE: 130 MMHG | BODY MASS INDEX: 32.58 KG/M2 | HEIGHT: 68 IN | DIASTOLIC BLOOD PRESSURE: 77 MMHG

## 2023-05-12 PROCEDURE — 95907 NVR CNDJ TST 1-2 STUDIES: CPT

## 2023-05-12 PROCEDURE — 95885 MUSC TST DONE W/NERV TST LIM: CPT | Mod: RT

## 2023-05-12 NOTE — PROCEDURE
[de-identified] : PRE-PROCEDURE\par \par * Was H&P completed and in chart at time of procedure ?  YES\par * Were the indications for the procedure appropriate ?  YES\par * Were the practitioner's entries in the patient's medical record appropriate ?  YES\par \par PROCEDURE\par * Did the practitioner maintain proper sterile technique ?  YES\par * If bleeding was encountered, did the practitioner manage it appropriately?  YES\par * Were complications, if any, recognized and managed appropriately?  YES\par * Was the practitioner's use of diagnostic services (e.g., lab, x-ray, MRI, CT) appropriate?  YES\par \par POST-PROCEDURE\par * Did the pre-procedure diagnosis coincide with the findings of the procedure?  YES\par * Was the procedure report complete, accurate and timely?  YES\par

## 2023-05-12 NOTE — ASSESSMENT
[FreeTextEntry1] : EMG/NCS RIGHT UE performed at today's office visit.  Results consistent with (1) mild, demyelinating, sensorimotor, median mononeuropathy across the right wrist; (2) moderate, mixed axonal and demyelinating, motor, ulnar mononeuropathy across the right elbow.  Full report to follow ...

## 2023-06-02 ENCOUNTER — APPOINTMENT (OUTPATIENT)
Dept: PHYSICAL MEDICINE AND REHAB | Facility: CLINIC | Age: 63
End: 2023-06-02
Payer: MEDICARE

## 2023-06-02 VITALS
WEIGHT: 215 LBS | HEIGHT: 68 IN | BODY MASS INDEX: 32.58 KG/M2 | SYSTOLIC BLOOD PRESSURE: 131 MMHG | RESPIRATION RATE: 18 BRPM | DIASTOLIC BLOOD PRESSURE: 79 MMHG | HEART RATE: 71 BPM

## 2023-06-02 PROCEDURE — 99214 OFFICE O/P EST MOD 30 MIN: CPT

## 2023-06-02 PROCEDURE — 76882 US LMTD JT/FCL EVL NVASC XTR: CPT | Mod: RT

## 2023-06-02 NOTE — PROCEDURE
[de-identified] : US EXAMINATION RIGHT ULNAR NERVE AT ELBOW \par \par CSA RIGHT ULNAR NERVE 8 CM ABOVE MEDIAL EPICONDYLE: 11 mm2 (borderline enlarged)\par CSA RIGHT ULNAR NERVE  2 CM ABOVE MEDIAL EPICONDYLE: 20 mm2 (enlarged)\par CSA RIGHT ULNAR NERVE AT LEVEL MEDIAL EPICONDYLE: 29 mm2 (markedly enlarged)\par CSA RIGHT ULNAR NERVE AT LEVEL HUMERAL ULNAR ARCADE: 29 mm2 (markedly enlarged)\par

## 2023-06-02 NOTE — ASSESSMENT
[FreeTextEntry1] : 63-year-old right-hand-dominant male with history of diabetes, hypertension, coronary artery disease status post stent, history of L4-L5 revision spinal fusion (3/23/23) and cervical spinal fusion (2014) now w/ (1) mild, demyelinating, sensorimotor, median mononeuropathy across the right wrist; (2) moderate, mixed axonal and demyelinating, motor, ulnar mononeuropathy across the right elbow.  I spent most of today's office visit (30 min) reviewing the patient's EMG, reviewing and performing the MSK US examination right ulnar nerve, discussing etiology, pathogenesis and further non-operative vs. operative management.  Taken with the patient's EDX data, I have recommended the patient consult Ortho Hand MD for consideration of formal ulnar nerve release surgery as there are no good non-operative management measures for advanced UNE.  Pt. is in agreement with plan.  All questions answered.  RTC prn.\par

## 2023-06-02 NOTE — HISTORY OF PRESENT ILLNESS
[FreeTextEntry1] : 63-year-old right-hand-dominant male with history of diabetes, hypertension, coronary artery disease status post stent, history of L4-L5 revision spinal fusion (3/23/23) and cervical spinal fusion (2014) returns to office for EMG review and diagnostic ultrasound right ulnar nerve.  Results detailed below.  Patient describes numbness and pain along the medial side of his hand and fifth digit.  He has tenderness over the medial elbow.

## 2023-06-02 NOTE — PHYSICAL EXAM
[FreeTextEntry1] : No acute distress\par Alert and oriented x3\par Nonobese\par Inspection right hand: No muscle atrophy\par Manual muscle testing strength 5 out of 5\par Positive Tinel's medial right elbow

## 2023-06-02 NOTE — DATA REVIEWED
[EMG] : EMG [FreeTextEntry1] : EMG/NCS RIGHT UE (5/12/23): Results consistent with (1) mild, demyelinating, sensorimotor, median mononeuropathy across the right wrist; (2) moderate, mixed axonal and demyelinating, motor, ulnar mononeuropathy across the right elbow.

## 2023-06-12 ENCOUNTER — APPOINTMENT (OUTPATIENT)
Dept: ORTHOPEDIC SURGERY | Facility: CLINIC | Age: 63
End: 2023-06-12
Payer: MEDICARE

## 2023-06-12 VITALS — SYSTOLIC BLOOD PRESSURE: 129 MMHG | DIASTOLIC BLOOD PRESSURE: 78 MMHG | HEART RATE: 61 BPM

## 2023-06-12 DIAGNOSIS — M25.521 PAIN IN RIGHT ELBOW: ICD-10-CM

## 2023-06-12 PROCEDURE — 73080 X-RAY EXAM OF ELBOW: CPT | Mod: RT

## 2023-06-12 PROCEDURE — 99214 OFFICE O/P EST MOD 30 MIN: CPT

## 2023-06-12 NOTE — PHYSICAL EXAM
[de-identified] : He is well-appearing on exam\par Examination of the [right] cubital tunnel reveals discomfort with compression with associated numbness and tingling at the fingertips. There is a positive tinel.\par  [de-identified] : 3 views of right elbow were obtained today in my office and were seen by me and discussed with the patient. \par These show findings consistent with minimal arthropathy\par

## 2023-06-12 NOTE — HISTORY OF PRESENT ILLNESS
[FreeTextEntry1] : Juaquin is a pleasant 63-year-old male who for the last 3 months has developed worsening right elbow pain discomfort with episodes of numbness and tingling he points to the small and adjacent ring finger.  He denies numbness in the radial distribution.  He denies wrist discomfort.\par \par He does present with the results of a nerve study as well as an ultrasound of the cubital tunnel.

## 2023-06-12 NOTE — ASSESSMENT
[FreeTextEntry1] : ASSESSMENT:\par \par The patient comes in today with acute symptoms of right elbow discomfort with numbness in the ulnar distribution consistent with a cubital tunnel disease.  We have discussed treatment modalities and prognosis.  He does present with the results of a nerve study showing cubital tunnel disease as well as a nerve ultrasound showing severe thickening of the ulnar nerve at the level of the cubital tunnel.  With this in mind he has had the symptoms for about 3 months now and we have discussed a trial of extension splinting in the hopes of nonoperative improvement.\par \par The patient was adequately and thoroughly informed of my assessment of their current condition(s).  - This may diminish bodily function for the extremity.\par We discussed prognosis, treatment modalities including operative and nonoperative options for the above diagnostic assessment.\par \par [For this I was able to review other physician’s note(s) including reviewing other tests, imaging results as well as personally view these results for my own interpretation.]\par \par The patient was adequately and thoroughly informed of my assessment of their current condition(s). \par \par DISCUSSION:\par 1.  For his right elbow pain and discomfort with cubital tunnel symptoms today he was fitted with an elbow extension brace.  He tolerated placement of this well\par \par \par

## 2023-07-27 ENCOUNTER — APPOINTMENT (OUTPATIENT)
Dept: ORTHOPEDIC SURGERY | Facility: CLINIC | Age: 63
End: 2023-07-27
Payer: MEDICARE

## 2023-07-27 VITALS — HEIGHT: 68 IN | WEIGHT: 215 LBS | BODY MASS INDEX: 32.58 KG/M2

## 2023-07-27 PROCEDURE — 99214 OFFICE O/P EST MOD 30 MIN: CPT

## 2023-07-27 NOTE — PHYSICAL EXAM
[de-identified] : He is well-appearing on exam\par Examination of the [right] cubital tunnel reveals discomfort with compression with associated numbness and tingling at the fingertips. There is a positive tinel.\par

## 2023-07-27 NOTE — HISTORY OF PRESENT ILLNESS
[FreeTextEntry1] : Juaquin returns for follow-up with known history of right-sided cubital tunnel disease.  At this point we have attempted activity modification and elbow extension bracing.  Of note he does have findings consistent with cubital tunnel disease in both nerve study as well as ultrasound\par On today's follow-up he says that symptoms have improved slightly with bracing and at this point he would like to proceed with definitive care no

## 2023-07-27 NOTE — ASSESSMENT
[FreeTextEntry1] : ASSESSMENT:\par \par The patient comes in today with known symptoms of right elbow discomfort with numbness in the ulnar distribution consistent with a cubital tunnel disease.  We have discussed treatment modalities and prognosis.  We have discussed nerve study showing cubital tunnel disease as well as a nerve ultrasound showing severe thickening of the ulnar nerve at the level of the cubital tunnel. \par At this point in time he has failed nonoperative modalities and he would like to proceed with definitive care.\par \par The patient was adequately and thoroughly informed of my assessment of their current condition(s).  - This may diminish bodily function for the extremity.\par We discussed prognosis, treatment modalities including operative and nonoperative options for the above diagnostic assessment.\par \par She was adequately and thoroughly informed of my assessment of their current condition(s). \par \par The patient has significant findings consistent with cubital tunnel syndrome. We discussed prognosis of this condition.  We discussed operative and nonoperative modalities for this condition. We discussed that surgery would be of significant benefit for their acute painful symptoms, however the efficacy of surgery for sensory and motor recovery will be determined only with time.  These might not improve at all. With this in mind they elected to proceed with a cubital tunnel release with medial epicondylectomy\par \par Surgical Consent Discussion:\par \par I explained the risks and benefits of surgical intervention to the patient. The risks include, but are not limited to: pain, infection, swelling, stiffness, injury to underlying neurovascular structures, scar sensitivity, incomplete resolution of symptoms, the possibility of the need for future surgery and finally the need to comply with a post-operative occupational therapy protocol. She understands, agrees and informed consent was obtained. The patient’s surgery will be scheduled in the near future.\par \par \par [For this I was able to review other physician’s note(s) including reviewing other tests, imaging results as well as personally view these results for my own interpretation.]\par \par The patient was adequately and thoroughly informed of my assessment of their current condition(s). \par \par DISCUSSION:\par 1.  He elects to proceed with right elbow cubital tunnel release possible medial epicondylectomy\par \par

## 2023-08-17 ENCOUNTER — OUTPATIENT (OUTPATIENT)
Dept: OUTPATIENT SERVICES | Facility: HOSPITAL | Age: 63
LOS: 1 days | End: 2023-08-17
Payer: MEDICARE

## 2023-08-17 VITALS
WEIGHT: 209.22 LBS | SYSTOLIC BLOOD PRESSURE: 130 MMHG | DIASTOLIC BLOOD PRESSURE: 78 MMHG | HEIGHT: 66 IN | OXYGEN SATURATION: 96 % | HEART RATE: 70 BPM | RESPIRATION RATE: 18 BRPM | TEMPERATURE: 98 F

## 2023-08-17 DIAGNOSIS — Z98.890 OTHER SPECIFIED POSTPROCEDURAL STATES: Chronic | ICD-10-CM

## 2023-08-17 DIAGNOSIS — Z01.818 ENCOUNTER FOR OTHER PREPROCEDURAL EXAMINATION: ICD-10-CM

## 2023-08-17 DIAGNOSIS — Z98.89 OTHER SPECIFIED POSTPROCEDURAL STATES: Chronic | ICD-10-CM

## 2023-08-17 DIAGNOSIS — Z98.1 ARTHRODESIS STATUS: Chronic | ICD-10-CM

## 2023-08-17 DIAGNOSIS — Z29.9 ENCOUNTER FOR PROPHYLACTIC MEASURES, UNSPECIFIED: ICD-10-CM

## 2023-08-17 DIAGNOSIS — I25.10 ATHEROSCLEROTIC HEART DISEASE OF NATIVE CORONARY ARTERY WITHOUT ANGINA PECTORIS: ICD-10-CM

## 2023-08-17 DIAGNOSIS — G56.20 LESION OF ULNAR NERVE, UNSPECIFIED UPPER LIMB: ICD-10-CM

## 2023-08-17 DIAGNOSIS — M54.9 DORSALGIA, UNSPECIFIED: Chronic | ICD-10-CM

## 2023-08-17 LAB
A1C WITH ESTIMATED AVERAGE GLUCOSE RESULT: 7.2 % — HIGH (ref 4–5.6)
ALBUMIN SERPL ELPH-MCNC: 4 G/DL — SIGNIFICANT CHANGE UP (ref 3.3–5.2)
ALP SERPL-CCNC: 134 U/L — HIGH (ref 40–120)
ALT FLD-CCNC: 20 U/L — SIGNIFICANT CHANGE UP
ANION GAP SERPL CALC-SCNC: 13 MMOL/L — SIGNIFICANT CHANGE UP (ref 5–17)
APTT BLD: 36.8 SEC — HIGH (ref 24.5–35.6)
AST SERPL-CCNC: 11 U/L — SIGNIFICANT CHANGE UP
BASOPHILS # BLD AUTO: 0.03 K/UL — SIGNIFICANT CHANGE UP (ref 0–0.2)
BASOPHILS NFR BLD AUTO: 0.4 % — SIGNIFICANT CHANGE UP (ref 0–2)
BILIRUB SERPL-MCNC: <0.2 MG/DL — LOW (ref 0.4–2)
BUN SERPL-MCNC: 5.2 MG/DL — LOW (ref 8–20)
CALCIUM SERPL-MCNC: 9.7 MG/DL — SIGNIFICANT CHANGE UP (ref 8.4–10.5)
CHLORIDE SERPL-SCNC: 98 MMOL/L — SIGNIFICANT CHANGE UP (ref 96–108)
CO2 SERPL-SCNC: 28 MMOL/L — SIGNIFICANT CHANGE UP (ref 22–29)
CREAT SERPL-MCNC: 0.56 MG/DL — SIGNIFICANT CHANGE UP (ref 0.5–1.3)
EGFR: 111 ML/MIN/1.73M2 — SIGNIFICANT CHANGE UP
EOSINOPHIL # BLD AUTO: 0.22 K/UL — SIGNIFICANT CHANGE UP (ref 0–0.5)
EOSINOPHIL NFR BLD AUTO: 2.6 % — SIGNIFICANT CHANGE UP (ref 0–6)
ESTIMATED AVERAGE GLUCOSE: 160 MG/DL — HIGH (ref 68–114)
GLUCOSE SERPL-MCNC: 83 MG/DL — SIGNIFICANT CHANGE UP (ref 70–99)
HCT VFR BLD CALC: 39.4 % — SIGNIFICANT CHANGE UP (ref 39–50)
HGB BLD-MCNC: 12.8 G/DL — LOW (ref 13–17)
IMM GRANULOCYTES NFR BLD AUTO: 0.4 % — SIGNIFICANT CHANGE UP (ref 0–0.9)
INR BLD: 1.07 RATIO — SIGNIFICANT CHANGE UP (ref 0.85–1.18)
LYMPHOCYTES # BLD AUTO: 1.01 K/UL — SIGNIFICANT CHANGE UP (ref 1–3.3)
LYMPHOCYTES # BLD AUTO: 11.9 % — LOW (ref 13–44)
MCHC RBC-ENTMCNC: 27 PG — SIGNIFICANT CHANGE UP (ref 27–34)
MCHC RBC-ENTMCNC: 32.5 GM/DL — SIGNIFICANT CHANGE UP (ref 32–36)
MCV RBC AUTO: 83.1 FL — SIGNIFICANT CHANGE UP (ref 80–100)
MONOCYTES # BLD AUTO: 1.22 K/UL — HIGH (ref 0–0.9)
MONOCYTES NFR BLD AUTO: 14.4 % — HIGH (ref 2–14)
NEUTROPHILS # BLD AUTO: 5.96 K/UL — SIGNIFICANT CHANGE UP (ref 1.8–7.4)
NEUTROPHILS NFR BLD AUTO: 70.3 % — SIGNIFICANT CHANGE UP (ref 43–77)
PLATELET # BLD AUTO: 280 K/UL — SIGNIFICANT CHANGE UP (ref 150–400)
POTASSIUM SERPL-MCNC: 4.5 MMOL/L — SIGNIFICANT CHANGE UP (ref 3.5–5.3)
POTASSIUM SERPL-SCNC: 4.5 MMOL/L — SIGNIFICANT CHANGE UP (ref 3.5–5.3)
PROT SERPL-MCNC: 7.7 G/DL — SIGNIFICANT CHANGE UP (ref 6.6–8.7)
PROTHROM AB SERPL-ACNC: 11.9 SEC — SIGNIFICANT CHANGE UP (ref 9.5–13)
RBC # BLD: 4.74 M/UL — SIGNIFICANT CHANGE UP (ref 4.2–5.8)
RBC # FLD: 14.5 % — SIGNIFICANT CHANGE UP (ref 10.3–14.5)
SODIUM SERPL-SCNC: 139 MMOL/L — SIGNIFICANT CHANGE UP (ref 135–145)
WBC # BLD: 8.47 K/UL — SIGNIFICANT CHANGE UP (ref 3.8–10.5)
WBC # FLD AUTO: 8.47 K/UL — SIGNIFICANT CHANGE UP (ref 3.8–10.5)

## 2023-08-17 PROCEDURE — 85025 COMPLETE CBC W/AUTO DIFF WBC: CPT

## 2023-08-17 PROCEDURE — 83036 HEMOGLOBIN GLYCOSYLATED A1C: CPT

## 2023-08-17 PROCEDURE — 85610 PROTHROMBIN TIME: CPT

## 2023-08-17 PROCEDURE — 85730 THROMBOPLASTIN TIME PARTIAL: CPT

## 2023-08-17 PROCEDURE — 93010 ELECTROCARDIOGRAM REPORT: CPT

## 2023-08-17 PROCEDURE — G0463: CPT

## 2023-08-17 PROCEDURE — 36415 COLL VENOUS BLD VENIPUNCTURE: CPT

## 2023-08-17 PROCEDURE — 80053 COMPREHEN METABOLIC PANEL: CPT

## 2023-08-17 PROCEDURE — 93005 ELECTROCARDIOGRAM TRACING: CPT

## 2023-08-17 NOTE — H&P PST ADULT - NSICDXPASTMEDICALHX_GEN_ALL_CORE_FT
PAST MEDICAL HISTORY:  CAD (coronary artery disease)     Chronic obstructive pulmonary disease (COPD)     Herniated lumbar intervertebral disc     HLD (hyperlipidemia)     HTN (hypertension)     Lesion of ulnar nerve, unspecified upper limb     Peripheral neuropathy     Presence of stent in coronary artery

## 2023-08-17 NOTE — H&P PST ADULT - MUSCULOSKELETAL
right elbow/no calf tenderness/decreased ROM/normal gait/strength 5/5 bilateral upper extremities/strength 5/5 bilateral lower extremities right elbow/no calf tenderness/decreased ROM due to pain/strength 5/5 bilateral upper extremities/strength 5/5 bilateral lower extremities/back exam/abnormal gait details…

## 2023-08-17 NOTE — H&P PST ADULT - ASSESSMENT
64 yo M PMH of HTN, HLD, DM2, CAD (on plavix), COPD, pulmonary nodules, AAA, chronic back pain, presents with c/o right elbow pain. Patient has a known Hx of right-sided cubital tunnel disease. He tried activity modification and elbow extension bracing. He reports that symptoms have improved only slightly with bracing. Preop assessment prior to right elbow cubital tunnel release w/Dr Greenwood scheduled for 2023    Written and oral ERP instructions given, pt verbalized understanding.     Patient was educated on preop preparation with written and verbal instructions. Pt was informed to obtain medical and cardiac clearances >3 days before surgery. Pt will ask his cardiologist for instructions on Plavix. Pt was educated on NSAIDs, multivitamins and herbals that increase the risk of bleeding and need to be stopped 7 days before procedure. Pt verbalized understanding of the above.     OPIOID RISK TOOL    STEVE EACH BOX THAT APPLIES AND ADD TOTALS AT THE END    FAMILY HISTORY OF SUBSTANCE ABUSE                 FEMALE         MALE                                                Alcohol                             [  ]1 pt          [  ]3pts                                               Illegal Durgs                     [  ]2 pts        [  ]3pts                                               Rx Drugs                           [  ]4 pts        [  ]4 pts    PERSONAL HISTORY OF SUBSTANCE ABUSE                                                                                          Alcohol                             [  ]3 pts       [  ]3 pts                                               Illegal Drugs                     [  ]4 pts        [  ]4 pts                                               Rx Drugs                           [  ]5 pts        [  ]5 pts    AGE BETWEEN 16-45 YEARS                                      [  ]1 pt         [  ]1 pt    HISTORY OF PREADOLESCENT   SEXUAL ABUSE                                                             [  ]3 pts        [  ]0pts    PSYCHOLOGICAL DISEASE                     ADD, OCD, Bipolar, Schizophrenia        [  ]2 pts         [  ]2 pts                      Depression                                               [  ]1 pt           [  ]1 pt           SCORING TOTAL   (add numbers and type here)              ( 0 )                                     A score of 3 or lower indicated LOW risk for future opioid abuse  A score of 4 to 7 indicated moderate risk for future opioid abuse  A score of 8 or higher indicates a high risk for opioid abuse    CAPRINI VTE 2.0 SCORE [CLOT updated 2019]    AGE RELATED RISK FACTORS                                                       MOBILITY RELATED FACTORS  [ ] Age 41-60 years                                            (1 Point)                    [ ] Bed rest                                                        (1 Point)  [ x] Age: 61-74 years                                           (2 Points)                  [ ] Plaster cast                                                   (2 Points)  [ ] Age= 75 years                                              (3 Points)                    [ ] Bed bound for more than 72 hours                 (2 Points)    DISEASE RELATED RISK FACTORS                                               GENDER SPECIFIC FACTORS  [ ] Edema in the lower extremities                       (1 Point)              [ ] Pregnancy                                                     (1 Point)  [ ] Varicose veins                                               (1 Point)                     [ ] Post-partum < 6 weeks                                   (1 Point)             [x ] BMI > 25 Kg/m2                                            (1 Point)                     [ ] Hormonal therapy  or oral contraception          (1 Point)                 [ ] Sepsis (in the previous month)                        (1 Point)               [ ] History of pregnancy complications                 (1 point)  [ ] Pneumonia or serious lung disease                                               [ ] Unexplained or recurrent                     (1 Point)           (in the previous month)                               (1 Point)  [ ] Abnormal pulmonary function test                     (1 Point)                 SURGERY RELATED RISK FACTORS  [ ] Acute myocardial infarction                              (1 Point)               [ ]  Section                                             (1 Point)  [ ] Congestive heart failure (in the previous month)  (1 Point)      [ ] Minor surgery                                                  (1 Point)   [ ] Inflammatory bowel disease                             (1 Point)               [ ] Arthroscopic surgery                                        (2 Points)  [ ] Central venous access                                      (2 Points)                [x ] General surgery lasting more than 45 minutes (2 points)  [ ] Malignancy- Present or previous                   (2 Points)                [ ] Elective arthroplasty                                         (5 points)    [ ] Stroke (in the previous month)                          (5 Points)                                                                                                                                                           HEMATOLOGY RELATED FACTORS                                                 TRAUMA RELATED RISK FACTORS  [ ] Prior episodes of VTE                                     (3 Points)                [ ] Fracture of the hip, pelvis, or leg                       (5 Points)  [ ] Positive family history for VTE                         (3 Points)             [ ] Acute spinal cord injury (in the previous month)  (5 Points)  [ ] Prothrombin 65691 A                                     (3 Points)               [ ] Paralysis  (less than 1 month)                             (5 Points)  [ ] Factor V Leiden                                             (3 Points)                  [ ] Multiple Trauma within 1 month                        (5 Points)  [ ] Lupus anticoagulants                                     (3 Points)                                                           [ ] Anticardiolipin antibodies                               (3 Points)                                                       [ ] High homocysteine in the blood                      (3 Points)                                             [ ] Other congenital or acquired thrombophilia      (3 Points)                                                [ ] Heparin induced thrombocytopenia                  (3 Points)                                     Total Score [      5    ] 64 yo M PMH of HTN, HLD, DM2, CAD/PCI w/stent x1 in  (on plavix and asa 81 mg), severe COPD, pulmonary nodules, AAA 3.5 cm (under observation), chronic back pain with b/l LE peripheral neuropathy, presents with c/o right elbow pain for the past 2 months. Patient has a known Hx of right-sided cubital tunnel disease. He states "I have a compressed nerve in my right elbow". He tried activity modification and elbow extension bracing. He reports that symptoms have improved only slightly with bracing. He also reports numbness going down his right arm to his right small finger. Preop assessment prior to right elbow cubital tunnel release w/Dr Greenwood scheduled for 2023    Written and oral ERP instructions given, pt verbalized understanding.     Patient was educated on preop preparation with written and verbal instructions. Pt was informed to obtain medical, cardiac, pulmonology clearances >3 days before surgery. Pt will ask his cardiologist for instructions on Plavix. Pt was educated on NSAIDs, multivitamins and herbals that increase the risk of bleeding and need to be stopped 7 days before procedure. Pt verbalized understanding of the above.     OPIOID RISK TOOL    STEVE EACH BOX THAT APPLIES AND ADD TOTALS AT THE END    FAMILY HISTORY OF SUBSTANCE ABUSE                 FEMALE         MALE                                                Alcohol                             [  ]1 pt          [  ]3pts                                               Illegal Drugs                     [  ]2 pts        [  ]3pts                                               Rx Drugs                           [  ]4 pts        [  ]4 pts    PERSONAL HISTORY OF SUBSTANCE ABUSE                                                                                          Alcohol                             [  ]3 pts       [  ]3 pts                                               Illegal Drugs                     [  ]4 pts        [  ]4 pts                                               Rx Drugs                           [  ]5 pts        [  ]5 pts    AGE BETWEEN 16-45 YEARS                                      [  ]1 pt         [  ]1 pt    HISTORY OF PREADOLESCENT   SEXUAL ABUSE                                                             [  ]3 pts        [  ]0pts    PSYCHOLOGICAL DISEASE                     ADD, OCD, Bipolar, Schizophrenia        [  ]2 pts         [  ]2 pts                      Depression                                               [  ]1 pt           [  ]1 pt           SCORING TOTAL   (add numbers and type here)              ( 0 )                                     A score of 3 or lower indicated LOW risk for future opioid abuse  A score of 4 to 7 indicated moderate risk for future opioid abuse  A score of 8 or higher indicates a high risk for opioid abuse    CAPRINI VTE 2.0 SCORE [CLOT updated 2019]    AGE RELATED RISK FACTORS                                                       MOBILITY RELATED FACTORS  [ ] Age 41-60 years                                            (1 Point)                    [ ] Bed rest                                                        (1 Point)  [ x] Age: 61-74 years                                           (2 Points)                  [ ] Plaster cast                                                   (2 Points)  [ ] Age= 75 years                                              (3 Points)                    [ ] Bed bound for more than 72 hours                 (2 Points)    DISEASE RELATED RISK FACTORS                                               GENDER SPECIFIC FACTORS  [ ] Edema in the lower extremities                       (1 Point)              [ ] Pregnancy                                                     (1 Point)  [x ] Varicose veins                                               (1 Point)                     [ ] Post-partum < 6 weeks                                   (1 Point)             [x ] BMI > 25 Kg/m2                                            (1 Point)                     [ ] Hormonal therapy  or oral contraception          (1 Point)                 [ ] Sepsis (in the previous month)                        (1 Point)               [ ] History of pregnancy complications                 (1 point)  [ ] Pneumonia or serious lung disease                                               [ ] Unexplained or recurrent                     (1 Point)           (in the previous month)                               (1 Point)  [ ] Abnormal pulmonary function test                     (1 Point)                 SURGERY RELATED RISK FACTORS  [ ] Acute myocardial infarction                              (1 Point)               [ ]  Section                                             (1 Point)  [ ] Congestive heart failure (in the previous month)  (1 Point)      [ ] Minor surgery                                                  (1 Point)   [ ] Inflammatory bowel disease                             (1 Point)               [ ] Arthroscopic surgery                                        (2 Points)  [ ] Central venous access                                      (2 Points)                [x ] General surgery lasting more than 45 minutes (2 points)  [ ] Malignancy- Present or previous                   (2 Points)                [ ] Elective arthroplasty                                         (5 points)    [ ] Stroke (in the previous month)                          (5 Points)                                                                                                                                                           HEMATOLOGY RELATED FACTORS                                                 TRAUMA RELATED RISK FACTORS  [ ] Prior episodes of VTE                                     (3 Points)                [ ] Fracture of the hip, pelvis, or leg                       (5 Points)  [ ] Positive family history for VTE                         (3 Points)             [ ] Acute spinal cord injury (in the previous month)  (5 Points)  [ ] Prothrombin 65106 A                                     (3 Points)               [ ] Paralysis  (less than 1 month)                             (5 Points)  [ ] Factor V Leiden                                             (3 Points)                  [ ] Multiple Trauma within 1 month                        (5 Points)  [ ] Lupus anticoagulants                                     (3 Points)                                                           [ ] Anticardiolipin antibodies                               (3 Points)                                                       [ ] High homocysteine in the blood                      (3 Points)                                             [ ] Other congenital or acquired thrombophilia      (3 Points)                                                [ ] Heparin induced thrombocytopenia                  (3 Points)                                     Total Score [      6   ]

## 2023-08-17 NOTE — H&P PST ADULT - OTHER CARE PROVIDERS
PCP Dr Angulo, cardiology PCP Dr Angulo, cardiology Dr Vira [facility], pulmonology Dr Bailey [phone]

## 2023-08-17 NOTE — H&P PST ADULT - NSICDXPASTSURGICALHX_GEN_ALL_CORE_FT
PAST SURGICAL HISTORY:  H/O cardiac catheterization     History of lumbar surgery     S/P appendectomy at age 13 or 14    S/P cervical spinal fusion 12/9/2014    S/P knee surgery 15 years ago

## 2023-08-17 NOTE — H&P PST ADULT - NSICDXFAMILYHX_GEN_ALL_CORE_FT
FAMILY HISTORY:  Father  Still living? No  Congestive heart failure, Age at diagnosis: Age Unknown    Mother  Still living? No  Acute myocardial infarction, Age at diagnosis: Age Unknown

## 2023-08-17 NOTE — H&P PST ADULT - NEUROLOGICAL
cranial nerves II-XII intact/sensation intact/responds to verbal commands/no spontaneous movement details…

## 2023-08-17 NOTE — H&P PST ADULT - GASTROINTESTINAL
soft/nontender/nondistended/normal active bowel sounds soft/nontender/normal active bowel sounds/distended details…

## 2023-08-17 NOTE — H&P PST ADULT - PROBLEM SELECTOR PLAN 2
preop assessment, medical and cardiac clearance pending, right elbow cubital tunnel release w/Dr Greenwood scheduled for 9/6/2023 preop assessment, medical, cardiac, pulmonary clearances pending, right elbow cubital tunnel release w/Dr Greenwood scheduled for 9/6/2023

## 2023-08-17 NOTE — H&P PST ADULT - PROBLEM SELECTOR PLAN 3
caprini score 5, moderate risk for dvt, SCD ordered, surgical team to assess for dvt prophylaxis caprini score 6, moderate risk for dvt, SCD ordered, surgical team to assess for dvt prophylaxis

## 2023-08-17 NOTE — H&P PST ADULT - HISTORY OF PRESENT ILLNESS
64 yo M PMH of HTN, HLD, DM2, CAD (on plavix), COPD, pulmonary nodules, AAA, chronic back pain, presents with c/o right elbow pain. Patient has a known Hx of right-sided cubital tunnel disease. He tried activity modification and elbow extension bracing. He reports that symptoms have improved only slightly with bracing. Preop assessment prior to right elbow cubital tunnel release w/Dr Greenwood scheduled for 9/6/2023   64 yo M PMH of HTN, HLD, DM2, CAD/PCI w/stent x1 in 2022 (on plavix and asa 81 mg), severe COPD, pulmonary nodules, AAA 3.5 cm (under observation), chronic back pain with b/l LE peripheral neuropathy, presents with c/o right elbow pain for the past 2 months. Patient has a known Hx of right-sided cubital tunnel disease. He states "I have a compressed nerve in my right elbow". He tried activity modification and elbow extension bracing. He reports that symptoms have improved only slightly with bracing. He also reports numbness going down his right arm to his right small finger. Preop assessment prior to right elbow cubital tunnel release w/Dr Greenwood scheduled for 9/6/2023

## 2023-09-05 ENCOUNTER — TRANSCRIPTION ENCOUNTER (OUTPATIENT)
Age: 63
End: 2023-09-05

## 2023-09-06 ENCOUNTER — TRANSCRIPTION ENCOUNTER (OUTPATIENT)
Age: 63
End: 2023-09-06

## 2023-09-06 ENCOUNTER — APPOINTMENT (OUTPATIENT)
Dept: ORTHOPEDIC SURGERY | Facility: HOSPITAL | Age: 63
End: 2023-09-06

## 2023-09-06 ENCOUNTER — OUTPATIENT (OUTPATIENT)
Dept: INPATIENT UNIT | Facility: HOSPITAL | Age: 63
LOS: 1 days | End: 2023-09-06
Payer: MEDICARE

## 2023-09-06 VITALS
WEIGHT: 209.22 LBS | HEIGHT: 66 IN | HEART RATE: 87 BPM | TEMPERATURE: 98 F | RESPIRATION RATE: 20 BRPM | SYSTOLIC BLOOD PRESSURE: 168 MMHG | OXYGEN SATURATION: 95 % | DIASTOLIC BLOOD PRESSURE: 90 MMHG

## 2023-09-06 VITALS
DIASTOLIC BLOOD PRESSURE: 66 MMHG | OXYGEN SATURATION: 94 % | HEART RATE: 74 BPM | RESPIRATION RATE: 18 BRPM | SYSTOLIC BLOOD PRESSURE: 124 MMHG | TEMPERATURE: 97 F

## 2023-09-06 DIAGNOSIS — Z98.1 ARTHRODESIS STATUS: Chronic | ICD-10-CM

## 2023-09-06 DIAGNOSIS — Z98.89 OTHER SPECIFIED POSTPROCEDURAL STATES: Chronic | ICD-10-CM

## 2023-09-06 DIAGNOSIS — Z98.890 OTHER SPECIFIED POSTPROCEDURAL STATES: Chronic | ICD-10-CM

## 2023-09-06 DIAGNOSIS — G56.20 LESION OF ULNAR NERVE, UNSPECIFIED UPPER LIMB: ICD-10-CM

## 2023-09-06 LAB
GLUCOSE BLDC GLUCOMTR-MCNC: 128 MG/DL — HIGH (ref 70–99)
GLUCOSE BLDC GLUCOMTR-MCNC: 135 MG/DL — HIGH (ref 70–99)
GLUCOSE BLDC GLUCOMTR-MCNC: 136 MG/DL — HIGH (ref 70–99)

## 2023-09-06 PROCEDURE — 64718 REVISE ULNAR NERVE AT ELBOW: CPT | Mod: AS,RT

## 2023-09-06 PROCEDURE — 64718 REVISE ULNAR NERVE AT ELBOW: CPT | Mod: RT

## 2023-09-06 PROCEDURE — 82962 GLUCOSE BLOOD TEST: CPT

## 2023-09-06 RX ORDER — SODIUM CHLORIDE 9 MG/ML
1000 INJECTION, SOLUTION INTRAVENOUS
Refills: 0 | Status: DISCONTINUED | OUTPATIENT
Start: 2023-09-06 | End: 2023-09-06

## 2023-09-06 RX ORDER — METOPROLOL TARTRATE 50 MG
1 TABLET ORAL
Refills: 0 | DISCHARGE

## 2023-09-06 RX ORDER — ACETAMINOPHEN 500 MG
975 TABLET ORAL ONCE
Refills: 0 | Status: COMPLETED | OUTPATIENT
Start: 2023-09-06 | End: 2023-09-06

## 2023-09-06 RX ORDER — ACETAMINOPHEN 500 MG
1000 TABLET ORAL ONCE
Refills: 0 | Status: DISCONTINUED | OUTPATIENT
Start: 2023-09-06 | End: 2023-09-20

## 2023-09-06 RX ORDER — SODIUM CHLORIDE 9 MG/ML
3 INJECTION INTRAMUSCULAR; INTRAVENOUS; SUBCUTANEOUS ONCE
Refills: 0 | Status: COMPLETED | OUTPATIENT
Start: 2023-09-06 | End: 2023-09-06

## 2023-09-06 RX ORDER — ROSUVASTATIN CALCIUM 5 MG/1
1 TABLET ORAL
Refills: 0 | DISCHARGE

## 2023-09-06 RX ORDER — GABAPENTIN 400 MG/1
2 CAPSULE ORAL
Refills: 0 | DISCHARGE

## 2023-09-06 RX ORDER — CEFAZOLIN SODIUM 1 G
2000 VIAL (EA) INJECTION ONCE
Refills: 0 | Status: DISCONTINUED | OUTPATIENT
Start: 2023-09-06 | End: 2023-09-06

## 2023-09-06 RX ORDER — OXYCODONE AND ACETAMINOPHEN 5; 325 MG/1; MG/1
1 TABLET ORAL
Refills: 0 | DISCHARGE

## 2023-09-06 RX ORDER — CLOPIDOGREL BISULFATE 75 MG/1
1 TABLET, FILM COATED ORAL
Refills: 0 | DISCHARGE

## 2023-09-06 RX ORDER — METFORMIN HYDROCHLORIDE 850 MG/1
1 TABLET ORAL
Refills: 0 | DISCHARGE

## 2023-09-06 RX ORDER — LOSARTAN POTASSIUM 100 MG/1
1 TABLET, FILM COATED ORAL
Refills: 0 | DISCHARGE

## 2023-09-06 RX ORDER — ASPIRIN/CALCIUM CARB/MAGNESIUM 324 MG
1 TABLET ORAL
Refills: 0 | DISCHARGE

## 2023-09-06 RX ADMIN — SODIUM CHLORIDE 3 MILLILITER(S): 9 INJECTION INTRAMUSCULAR; INTRAVENOUS; SUBCUTANEOUS at 08:16

## 2023-09-06 RX ADMIN — Medication 975 MILLIGRAM(S): at 08:17

## 2023-09-06 NOTE — ASU DISCHARGE PLAN (ADULT/PEDIATRIC) - CARE PROVIDER_API CALL
Jose Greenwood  Orthopaedic Surgery  [address]  [place] [address]  Phone: [phone]  Fax: [phone]  Follow Up Time:

## 2023-09-06 NOTE — ASU DISCHARGE PLAN (ADULT/PEDIATRIC) - CALL YOUR DOCTOR IF YOU HAVE ANY OF THE FOLLOWING:
Swelling that gets worse/Pain not relieved by Medications/Numbness, tingling, color or temperature change to extremity Full range of motion of upper and lower extremities, no joint tenderness/swelling.

## 2023-09-21 ENCOUNTER — APPOINTMENT (OUTPATIENT)
Dept: ORTHOPEDIC SURGERY | Facility: CLINIC | Age: 63
End: 2023-09-21
Payer: MEDICARE

## 2023-09-21 VITALS — BODY MASS INDEX: 32.58 KG/M2 | WEIGHT: 215 LBS | HEIGHT: 68 IN

## 2023-09-21 DIAGNOSIS — G56.20 LESION OF ULNAR NERVE, UNSPECIFIED UPPER LIMB: ICD-10-CM

## 2023-09-21 PROBLEM — Z95.5 PRESENCE OF CORONARY ANGIOPLASTY IMPLANT AND GRAFT: Chronic | Status: ACTIVE | Noted: 2023-08-17

## 2023-09-21 PROBLEM — E78.5 HYPERLIPIDEMIA, UNSPECIFIED: Chronic | Status: ACTIVE | Noted: 2023-08-17

## 2023-09-21 PROBLEM — J44.9 CHRONIC OBSTRUCTIVE PULMONARY DISEASE, UNSPECIFIED: Chronic | Status: ACTIVE | Noted: 2023-08-17

## 2023-09-21 PROBLEM — I25.10 ATHEROSCLEROTIC HEART DISEASE OF NATIVE CORONARY ARTERY WITHOUT ANGINA PECTORIS: Chronic | Status: ACTIVE | Noted: 2023-08-17

## 2023-09-21 PROBLEM — I10 ESSENTIAL (PRIMARY) HYPERTENSION: Chronic | Status: ACTIVE | Noted: 2023-08-17

## 2023-09-21 PROBLEM — G62.9 POLYNEUROPATHY, UNSPECIFIED: Chronic | Status: ACTIVE | Noted: 2023-08-17

## 2023-09-21 PROCEDURE — 99024 POSTOP FOLLOW-UP VISIT: CPT

## 2023-11-02 ENCOUNTER — APPOINTMENT (OUTPATIENT)
Dept: ORTHOPEDIC SURGERY | Facility: CLINIC | Age: 63
End: 2023-11-02

## 2023-11-08 NOTE — ED ADULT NURSE NOTE - NEURO ASSESSMENT
Interval History:   Past Medical History:   Diagnosis Date    Anticoagulant long-term use     DVT (deep venous thrombosis)     Seizures     epilepsy controlled w/ marijuana       Past Surgical History:   Procedure Laterality Date    ANGIOGRAPHY OF LOWER EXTREMITY Left 10/7/2022    Procedure: Angiogram Extremity Unilateral;  Surgeon: Charles Patten III, MD;  Location: [facility] CATH LAB;  Service: Cardiology;  Laterality: Left;    AORTOGRAPHY WITH SERIALOGRAPHY Bilateral 10/7/2022    Procedure: AORTOGRAM, WITH SERIALOGRAPHY;  Surgeon: Charles Patten III, MD;  Location: [facility] CATH LAB;  Service: Cardiology;  Laterality: Bilateral;    AORTOGRAPHY WITH SERIALOGRAPHY N/A 4/4/2023    Procedure: AORTOGRAM, WITH SERIALOGRAPHY;  Surgeon: Royce Yi MD;  Location: [facility] CATH LAB/EP;  Service: Cardiology;  Laterality: N/A;    ATHERECTOMY, PERIPHERAL BLOOD VESSEL Left 4/4/2023    Procedure: ATHERECTOMY, PERIPHERAL BLOOD VESSEL;  Surgeon: Royce Yi MD;  Location: [facility] CATH LAB/EP;  Service: Cardiology;  Laterality: Left;    CLOSURE DEVICE  4/4/2023    Procedure: Placement of Closure Device;  Surgeon: Royce Yi MD;  Location: [facility] CATH LAB/EP;  Service: Cardiology;;    FILTER PROTECTION, PERIPHERAL  4/4/2023    Procedure: Filter Protection, Peripheral;  Surgeon: Royce Yi MD;  Location: [facility] CATH LAB/EP;  Service: Cardiology;;    IVUS (INTRAVASCULAR ULTRASOUND) Left 4/4/2023    Procedure: ULTRASOUND, INTRAVASCULAR;  Surgeon: Royce Yi MD;  Location: [facility] CATH LAB/EP;  Service: Cardiology;  Laterality: Left;    PTA, ILIAC ARTERY Left 4/4/2023    Procedure: PTA, Iliac Artery;  Surgeon: Royce Yi MD;  Location: [facility] CATH LAB/EP;  Service: Cardiology;  Laterality: Left;    THROMBECTOMY  4/4/2023    Procedure: THROMBECTOMY;  Surgeon: Royce Yi MD;  Location: [facility] CATH LAB/EP;  Service: Cardiology;;       Review of patient's allergies indicates:  No Known  Allergies    Medications:  Continuous Infusions:   heparin (porcine) in D5W 18 Units/kg/hr (11/08/23 0720)     Scheduled Meds:   carvediloL  6.25 mg Oral BID    nicotine  1 patch Transdermal Daily    NIFEdipine  60 mg Oral Daily     PRN Meds:acetaminophen, heparin (PORCINE), heparin (PORCINE), hydrALAZINE, HYDROcodone-acetaminophen, LIDOcaine, melatonin, senna-docusate 8.6-50 mg, sodium chloride 0.9%    Family History       Problem Relation (Age of Onset)    Heart disease Father    No Known Problems Mother          Tobacco Use    Smoking status: Every Day     Current packs/day: 2.00     Average packs/day: 2.0 packs/day for 48.9 years (97.7 ttl pk-yrs)     Types: Cigarettes     Start date: 1975    Smokeless tobacco: Never    Tobacco comments:     Pt is a 2 pk/day cigarette smoker x 49 yrs. 21 mg nicotine patch ordered Q day, and order requested upon discharge for 21 mg nicotine patch Q day. Pt states ready to quit. Ambulatory referral to Smoking Cessation clinic.   Substance and Sexual Activity    Alcohol use: Not Currently     Comment: rarely 1 beer    Drug use: Yes     Types: Marijuana    Sexual activity: Not on file       Review of Systems   Constitutional:  Positive for activity change, fatigue and unexpected weight change. Negative for fever.   Respiratory:  Positive for cough and shortness of breath.    Cardiovascular:  Negative for chest pain.   Musculoskeletal:  Positive for myalgias.   Psychiatric/Behavioral: Negative.       Objective:     Vital Signs (Most Recent):  Temp: 97.5 °F (36.4 °C) (11/08/23 1117)  Pulse: 78 (11/08/23 1117)  Resp: 16 (11/08/23 1434)  BP: 125/67 (11/08/23 1117)  SpO2: (!) 93 % (11/08/23 1117) Vital Signs (24h Range):  Temp:  [97.5 °F (36.4 °C)-98.2 °F (36.8 °C)] 97.5 °F (36.4 °C)  Pulse:  [65-99] 78  Resp:  [16-20] 16  SpO2:  [93 %-96 %] 93 %  BP: (112-178)/(58-97) 125/67     Weight: 59.6 kg (131 lb 8 oz)  Body mass index is 19.42 kg/m².       Physical Exam  Vitals and nursing note  reviewed.   Constitutional:       Appearance: He is underweight. He is ill-appearing (chronically).      Comments: Unkept appearance    HENT:      Head: Normocephalic.      Nose: Nose normal.      Mouth/Throat:      Mouth: Mucous membranes are moist.   Cardiovascular:      Rate and Rhythm: Normal rate.      Pulses: Normal pulses.   Pulmonary:      Effort: Pulmonary effort is normal.      Breath sounds: Decreased air movement present. Decreased breath sounds present.   Abdominal:      General: Abdomen is flat.      Palpations: Abdomen is soft.   Skin:     General: Skin is warm and dry.      Capillary Refill: Capillary refill takes less than 2 seconds.      Coloration: Skin is pale.      Findings: Rash present.   Neurological:      Mental Status: He is alert and oriented to person, place, and time.   Psychiatric:         Mood and Affect: Mood normal.         Behavior: Behavior normal. Behavior is cooperative.         Thought Content: Thought content normal.         Judgment: Judgment normal.            Review of Symptoms      Symptom Assessment (ESAS 0-10 Scale)  Pain:  0  Dyspnea:  5  Anxiety:  0  Nausea:  0  Depression:  0  Anorexia:  0  Fatigue:  5  Insomnia:  0  Restlessness:  0  Agitation:  0         Performance Status:  40    Living Arrangements:  Lives alone (lives in his car)    Psychosocial/Cultural:   See Palliative Psychosocial Note: No  Social Issues Identified: Coping deficit pt/family and Financial Issues  Bereavement Risk: No  Caregiver Needs Discussed. Caregiver Distress: No:   Cultural:   **Primary  to Follow**  Palliative Care  Consult: Yes     Time-Based Charting:  Yes  Chart Review: 22 minutes  Face to Face: 18 minutes  Symptom Assessment: 13 minutes  Coordination of Care: 15 minutes  Discharge Planning: 10 minutes  Advance Care Planning: 10 minutes  Goals of Care: 15 minutes    Total Time Spent: 103 minutes        Advance Care Planning   Advance Directives:   Living Will:  No    LaPOST: No    Do Not Resuscitate Status: Yes    Medical Power of : No      Decision Making:  Patient answered questions  Goals of Care: The patient endorses that what is most important right now is to focus on remaining as independent as possible, symptom/pain control, quality of life, even if it means sacrificing a little time, and improvement in condition but with limits to invasive therapies    Accordingly, we have decided that the best plan to meet the patient's goals includes continuing with treatment         Significant Labs: All pertinent labs within the past 24 hours have been reviewed.  CBC:   Recent Labs   Lab 11/08/23 0256   WBC 8.39  8.39   HGB 12.6*  12.6*   HCT 37.8*  37.8*   MCV 80*  80*     358     BMP:  Recent Labs   Lab 11/08/23 0256   GLU 93   *   K 4.6   CL 97   CO2 17*   BUN 14   CREATININE 1.0   CALCIUM 8.5*   MG 1.8     LFT:  Lab Results   Component Value Date    AST 34 11/08/2023    ALKPHOS 100 11/08/2023    BILITOT 0.4 11/08/2023     Albumin:   Albumin   Date Value Ref Range Status   11/08/2023 2.3 (L) 3.5 - 5.2 g/dL Final     Protein:   Total Protein   Date Value Ref Range Status   11/08/2023 7.9 6.0 - 8.4 g/dL Final     Lactic acid:   Lab Results   Component Value Date    LACTATE 1.4 11/07/2023    LACTATE 1.2 05/13/2023       Significant Imaging: I have reviewed all pertinent imaging results/findings within the past 24 hours.     WDL

## 2024-02-08 ENCOUNTER — TRANSCRIPTION ENCOUNTER (OUTPATIENT)
Age: 64
End: 2024-02-08

## 2024-02-09 ENCOUNTER — OUTPATIENT (OUTPATIENT)
Dept: OUTPATIENT SERVICES | Facility: HOSPITAL | Age: 64
LOS: 1 days | End: 2024-02-09
Payer: COMMERCIAL

## 2024-02-09 DIAGNOSIS — Z98.89 OTHER SPECIFIED POSTPROCEDURAL STATES: Chronic | ICD-10-CM

## 2024-02-09 DIAGNOSIS — Z98.890 OTHER SPECIFIED POSTPROCEDURAL STATES: Chronic | ICD-10-CM

## 2024-02-09 DIAGNOSIS — Z98.1 ARTHRODESIS STATUS: Chronic | ICD-10-CM

## 2024-02-09 DIAGNOSIS — M54.16 RADICULOPATHY, LUMBAR REGION: ICD-10-CM

## 2024-02-09 PROCEDURE — 76000 FLUOROSCOPY <1 HR PHYS/QHP: CPT

## 2024-02-09 PROCEDURE — 64484 NJX AA&/STRD TFRM EPI L/S EA: CPT | Mod: RT

## 2024-02-09 PROCEDURE — 64483 NJX AA&/STRD TFRM EPI L/S 1: CPT | Mod: 50

## 2024-05-14 ENCOUNTER — NON-APPOINTMENT (OUTPATIENT)
Age: 64
End: 2024-05-14

## 2024-05-30 ENCOUNTER — APPOINTMENT (OUTPATIENT)
Dept: SPINE | Facility: CLINIC | Age: 64
End: 2024-05-30

## 2024-06-07 ENCOUNTER — APPOINTMENT (OUTPATIENT)
Dept: SPINE | Facility: CLINIC | Age: 64
End: 2024-06-07
Payer: OTHER MISCELLANEOUS

## 2024-06-07 ENCOUNTER — NON-APPOINTMENT (OUTPATIENT)
Age: 64
End: 2024-06-07

## 2024-06-07 VITALS
HEART RATE: 62 BPM | DIASTOLIC BLOOD PRESSURE: 74 MMHG | BODY MASS INDEX: 32.58 KG/M2 | HEIGHT: 68 IN | SYSTOLIC BLOOD PRESSURE: 149 MMHG | OXYGEN SATURATION: 89 % | WEIGHT: 215 LBS

## 2024-06-07 DIAGNOSIS — M54.41 LUMBAGO WITH SCIATICA, LEFT SIDE: ICD-10-CM

## 2024-06-07 DIAGNOSIS — G89.29 CERVICALGIA: ICD-10-CM

## 2024-06-07 DIAGNOSIS — M54.2 CERVICALGIA: ICD-10-CM

## 2024-06-07 DIAGNOSIS — G89.29 LUMBAGO WITH SCIATICA, LEFT SIDE: ICD-10-CM

## 2024-06-07 DIAGNOSIS — M54.42 LUMBAGO WITH SCIATICA, LEFT SIDE: ICD-10-CM

## 2024-06-07 PROCEDURE — 99244 OFF/OP CNSLTJ NEW/EST MOD 40: CPT

## 2024-08-01 ENCOUNTER — OUTPATIENT (OUTPATIENT)
Dept: OUTPATIENT SERVICES | Facility: HOSPITAL | Age: 64
LOS: 1 days | End: 2024-08-01
Payer: COMMERCIAL

## 2024-08-01 VITALS
OXYGEN SATURATION: 94 % | HEART RATE: 62 BPM | TEMPERATURE: 98 F | RESPIRATION RATE: 18 BRPM | WEIGHT: 210.98 LBS | SYSTOLIC BLOOD PRESSURE: 138 MMHG | DIASTOLIC BLOOD PRESSURE: 78 MMHG | HEIGHT: 62.99 IN

## 2024-08-01 DIAGNOSIS — Z92.3 PERSONAL HISTORY OF IRRADIATION: ICD-10-CM

## 2024-08-01 DIAGNOSIS — Z98.890 OTHER SPECIFIED POSTPROCEDURAL STATES: Chronic | ICD-10-CM

## 2024-08-01 DIAGNOSIS — Z98.89 UNDEFINED: Chronic | ICD-10-CM

## 2024-08-01 DIAGNOSIS — G47.33 OBSTRUCTIVE SLEEP APNEA (ADULT) (PEDIATRIC): ICD-10-CM

## 2024-08-01 DIAGNOSIS — M54.50 LOW BACK PAIN, UNSPECIFIED: ICD-10-CM

## 2024-08-01 DIAGNOSIS — Z98.1 ARTHRODESIS STATUS: Chronic | ICD-10-CM

## 2024-08-01 DIAGNOSIS — E11.9 TYPE 2 DIABETES MELLITUS WITHOUT COMPLICATIONS: ICD-10-CM

## 2024-08-01 DIAGNOSIS — I71.40 ABDOMINAL AORTIC ANEURYSM, WITHOUT RUPTURE, UNSPECIFIED: ICD-10-CM

## 2024-08-01 DIAGNOSIS — J44.9 CHRONIC OBSTRUCTIVE PULMONARY DISEASE, UNSPECIFIED: ICD-10-CM

## 2024-08-01 DIAGNOSIS — Z98.89 OTHER SPECIFIED POSTPROCEDURAL STATES: Chronic | ICD-10-CM

## 2024-08-01 DIAGNOSIS — M54.10 RADICULOPATHY, SITE UNSPECIFIED: ICD-10-CM

## 2024-08-01 DIAGNOSIS — Z98.890 OTHER SPECIFIED POSTPROCEDURAL STATES: ICD-10-CM

## 2024-08-01 DIAGNOSIS — I25.10 ATHEROSCLEROTIC HEART DISEASE OF NATIVE CORONARY ARTERY WITHOUT ANGINA PECTORIS: ICD-10-CM

## 2024-08-01 DIAGNOSIS — Z01.818 ENCOUNTER FOR OTHER PREPROCEDURAL EXAMINATION: ICD-10-CM

## 2024-08-01 LAB
ALBUMIN SERPL ELPH-MCNC: 4.2 G/DL — SIGNIFICANT CHANGE UP (ref 3.3–5)
ALP SERPL-CCNC: 119 U/L — SIGNIFICANT CHANGE UP (ref 40–120)
ALT FLD-CCNC: 12 U/L — SIGNIFICANT CHANGE UP (ref 10–45)
ANION GAP SERPL CALC-SCNC: 12 MMOL/L — SIGNIFICANT CHANGE UP (ref 5–17)
AST SERPL-CCNC: 9 U/L — LOW (ref 10–40)
BILIRUB SERPL-MCNC: 0.3 MG/DL — SIGNIFICANT CHANGE UP (ref 0.2–1.2)
BLD GP AB SCN SERPL QL: NEGATIVE — SIGNIFICANT CHANGE UP
BUN SERPL-MCNC: 12 MG/DL — SIGNIFICANT CHANGE UP (ref 7–23)
CALCIUM SERPL-MCNC: 9.6 MG/DL — SIGNIFICANT CHANGE UP (ref 8.4–10.5)
CHLORIDE SERPL-SCNC: 98 MMOL/L — SIGNIFICANT CHANGE UP (ref 96–108)
CO2 SERPL-SCNC: 26 MMOL/L — SIGNIFICANT CHANGE UP (ref 22–31)
CREAT SERPL-MCNC: 0.58 MG/DL — SIGNIFICANT CHANGE UP (ref 0.5–1.3)
EGFR: 109 ML/MIN/1.73M2 — SIGNIFICANT CHANGE UP
GLUCOSE SERPL-MCNC: 123 MG/DL — HIGH (ref 70–99)
HCT VFR BLD CALC: 40.5 % — SIGNIFICANT CHANGE UP (ref 39–50)
HGB BLD-MCNC: 13.1 G/DL — SIGNIFICANT CHANGE UP (ref 13–17)
MCHC RBC-ENTMCNC: 27.1 PG — SIGNIFICANT CHANGE UP (ref 27–34)
MCHC RBC-ENTMCNC: 32.3 GM/DL — SIGNIFICANT CHANGE UP (ref 32–36)
MCV RBC AUTO: 83.7 FL — SIGNIFICANT CHANGE UP (ref 80–100)
NRBC # BLD: 0 /100 WBCS — SIGNIFICANT CHANGE UP (ref 0–0)
PLATELET # BLD AUTO: 194 K/UL — SIGNIFICANT CHANGE UP (ref 150–400)
POTASSIUM SERPL-MCNC: 4.6 MMOL/L — SIGNIFICANT CHANGE UP (ref 3.5–5.3)
POTASSIUM SERPL-SCNC: 4.6 MMOL/L — SIGNIFICANT CHANGE UP (ref 3.5–5.3)
PROT SERPL-MCNC: 7.5 G/DL — SIGNIFICANT CHANGE UP (ref 6–8.3)
RBC # BLD: 4.84 M/UL — SIGNIFICANT CHANGE UP (ref 4.2–5.8)
RBC # FLD: 14 % — SIGNIFICANT CHANGE UP (ref 10.3–14.5)
RH IG SCN BLD-IMP: POSITIVE — SIGNIFICANT CHANGE UP
SODIUM SERPL-SCNC: 136 MMOL/L — SIGNIFICANT CHANGE UP (ref 135–145)
WBC # BLD: 11.1 K/UL — HIGH (ref 3.8–10.5)
WBC # FLD AUTO: 11.1 K/UL — HIGH (ref 3.8–10.5)

## 2024-08-01 PROCEDURE — 87640 STAPH A DNA AMP PROBE: CPT

## 2024-08-01 PROCEDURE — G0463: CPT

## 2024-08-01 PROCEDURE — 86900 BLOOD TYPING SEROLOGIC ABO: CPT

## 2024-08-01 PROCEDURE — 87641 MR-STAPH DNA AMP PROBE: CPT

## 2024-08-01 PROCEDURE — 83036 HEMOGLOBIN GLYCOSYLATED A1C: CPT

## 2024-08-01 PROCEDURE — 86850 RBC ANTIBODY SCREEN: CPT

## 2024-08-01 PROCEDURE — 86901 BLOOD TYPING SEROLOGIC RH(D): CPT

## 2024-08-01 PROCEDURE — 85027 COMPLETE CBC AUTOMATED: CPT

## 2024-08-01 PROCEDURE — 80053 COMPREHEN METABOLIC PANEL: CPT

## 2024-08-01 NOTE — H&P PST ADULT - NSICDXFAMILYHX_GEN_ALL_CORE_FT
FAMILY HISTORY:  Father  Still living? No  Congestive heart failure, Age at diagnosis: Age Unknown    Mother  Still living? No  FHx: early MI, Age at diagnosis: Age Unknown    Sibling  Still living? Unknown  FH: arrhythmia, Age at diagnosis: Age Unknown  FH: lung cancer, Age at diagnosis: Age Unknown

## 2024-08-01 NOTE — H&P PST ADULT - PROBLEM SELECTOR PLAN 6
Requesting CT to evaluate status of AAA. Patient to schedule prior to surgery (states due for f/u CT this month).

## 2024-08-01 NOTE — H&P PST ADULT - PROBLEM SELECTOR PLAN 3
Cardiology evaluation.  Surgeon requesting patient hold plavix x 2 weeks; patient to confirm w/ cardiologist.

## 2024-08-01 NOTE — H&P PST ADULT - NSICDXPASTSURGICALHX_GEN_ALL_CORE_FT
PAST SURGICAL HISTORY:  H/O cardiac catheterization     History of lumbar surgery     S/P appendectomy at age 13 or 14    S/P cervical spinal fusion 12/9/2014    S/P cubital tunnel release     S/P meniscectomy

## 2024-08-01 NOTE — H&P PST ADULT - ENMT COMMENTS
bilateral hearing aids, hoarse voice comes and goes very limited ROM, hoarse voice bilateral hearing aids, "hoarse voice comes and goes"

## 2024-08-01 NOTE — H&P PST ADULT - NS MD HP INPLANTS MED DEV
Fusion cervical and lumbar x2/Vascular stents/Clips Fusion cervical and lumbar x2/Hearing aid/Vascular stents/Clips

## 2024-08-01 NOTE — H&P PST ADULT - OTHER CARE PROVIDERS
Cardiologist - Dr. Janak Dubon in [place], Pulmonologist - [facility] Dr. Bailey , Pain management - Dr. Mayes in [place] Cardiologist - Dr. Sixto Constantino [phone] appt on 8/5/24, Pulmonologist - [facility] Dr. Bailey , Pain management - Dr. Mayes in [place]

## 2024-08-01 NOTE — H&P PST ADULT - MUSCULOSKELETAL COMMENTS
muscle weakness in both legs, pain travels from hips to thighs and calves, limited range of motion w/ neck

## 2024-08-01 NOTE — H&P PST ADULT - ASSESSMENT
DASI score:  DASI activity: food shopping, mild house chores, 1 block - limits activity d/t pain in legs  Loose teeth or denture: upper and lower dentures DASI score: 6  DASI activity: food shopping, mild house chores, 1 block - limits activity d/t pain in legs  Loose teeth or denture: upper and lower dentures

## 2024-08-01 NOTE — H&P PST ADULT - NSICDXPASTMEDICALHX_GEN_ALL_CORE_FT
PAST MEDICAL HISTORY:  CAD (coronary artery disease)     Chronic obstructive pulmonary disease (COPD)     Herniated lumbar intervertebral disc     History of melanoma     History of radiation to head and neck region     HLD (hyperlipidemia)     HTN (hypertension)     Lesion of ulnar nerve, unspecified upper limb     Peripheral neuropathy     Presence of stent in coronary artery     Vocal cord cancer

## 2024-08-01 NOTE — H&P PST ADULT - HISTORY OF PRESENT ILLNESS
Coronary artery stent x 1 - [place]; 8/22/2022     seen 2 months ago - appt next week for clearance   echo - 2 months ago  stress test - 2022  next stress test in several months     63 yo male presents to PST prior to scheduled percutaneous placement thoracic spinal cord stimulator on 8/15/24 with Dr. Vigrilio Kasper. PMhx includes obesity (BMI 37.4), HTN, HLD, CAD s/p stent placement (x1, 8/2022), on Plavix and ASA 81mg, AAA (last measurement 3.5cm ~2023), DM, COPD, pulmonary nodules, vocal cord cancer (9/2023) s/p radiation (total of 6 weeks, last on 10/14/23), cervical fusion (2014; very limited ROM), lumbar fusion x 2 (2015, 2023), former smoker (quit in 2022; 40 pack years). Reports chronic lower back pain, radiating to hips, thighs and BLE. Denies chest pain, palpitations, sob at rest, dizziness, syncope, falls, dysphagia. Endorses hoarse voice ("comes and goes"), DOCKERY (after 1 block). Reports activity severely limited d/t back pain.     Originally scheduled for AMBI. Case rerouted to MAIN OR d/t high risk for difficult intubation. Case discussed w/ Dr. Sosa.  Requesting:  - ENT note (pt reports recently seen a few weeks ago)  - Oncologist note (pt reports recently seen a few weeks ago)  - CT to evaluate for size/status of AAA.  - Cardiac evaluation scheduled for 8/5/24.  - Pt is on plavix and ASA. As per surgeon's note, patient to stop Plavix 2 weeks prior (last dose 7/31/24). Advised uninterrupted ASA 81mg. Patient to confirm w/ cardiologist.    Communicated above plan w/ patient who verbalized understanding.

## 2024-08-01 NOTE — H&P PST ADULT - PROBLEM SELECTOR PLAN 1
Scheduled percutaneous placement thoracic spinal cord stimulator on 8/15/24 with Dr. Virgilio Kasper.   Pre-op instructions given. Questions answered.  Continue aspirin 81 mg.

## 2024-08-02 DIAGNOSIS — A49.01 METHICILLIN SUSCEPTIBLE STAPHYLOCOCCUS AUREUS INFECTION, UNSPECIFIED SITE: ICD-10-CM

## 2024-08-02 LAB
A1C WITH ESTIMATED AVERAGE GLUCOSE RESULT: 6.3 % — HIGH (ref 4–5.6)
ESTIMATED AVERAGE GLUCOSE: 134 MG/DL — HIGH (ref 68–114)
MRSA PCR RESULT.: SIGNIFICANT CHANGE UP
S AUREUS DNA NOSE QL NAA+PROBE: DETECTED

## 2024-08-02 RX ORDER — MUPIROCIN 20 MG/G
2 OINTMENT TOPICAL
Qty: 1 | Refills: 0 | Status: ACTIVE | COMMUNITY
Start: 2024-08-02 | End: 1900-01-01

## 2024-08-12 ENCOUNTER — TRANSCRIPTION ENCOUNTER (OUTPATIENT)
Age: 64
End: 2024-08-12

## 2024-08-13 ENCOUNTER — APPOINTMENT (OUTPATIENT)
Dept: SPINE | Facility: HOSPITAL | Age: 64
End: 2024-08-13

## 2024-08-13 ENCOUNTER — TRANSCRIPTION ENCOUNTER (OUTPATIENT)
Age: 64
End: 2024-08-13

## 2024-08-13 PROBLEM — Z92.3 PERSONAL HISTORY OF IRRADIATION: Chronic | Status: ACTIVE | Noted: 2024-08-01

## 2024-08-13 PROBLEM — Z85.820 PERSONAL HISTORY OF MALIGNANT MELANOMA OF SKIN: Chronic | Status: ACTIVE | Noted: 2024-08-01

## 2024-08-13 RX ORDER — TIOTROPIUM BROMIDE AND OLODATEROL 3.124; 2.736 UG/1; UG/1
2 SPRAY, METERED RESPIRATORY (INHALATION)
Refills: 0 | DISCHARGE

## 2024-08-29 ENCOUNTER — APPOINTMENT (OUTPATIENT)
Dept: SPINE | Facility: CLINIC | Age: 64
End: 2024-08-29
Payer: OTHER MISCELLANEOUS

## 2024-08-29 VITALS
SYSTOLIC BLOOD PRESSURE: 157 MMHG | DIASTOLIC BLOOD PRESSURE: 76 MMHG | BODY MASS INDEX: 32.58 KG/M2 | WEIGHT: 215 LBS | HEART RATE: 56 BPM | HEIGHT: 68 IN | OXYGEN SATURATION: 95 %

## 2024-08-29 PROBLEM — C32.0 MALIGNANT NEOPLASM OF GLOTTIS: Chronic | Status: ACTIVE | Noted: 2024-08-01

## 2024-08-29 PROCEDURE — 99024 POSTOP FOLLOW-UP VISIT: CPT

## 2024-08-29 RX ORDER — CEPHALEXIN 500 MG/1
500 CAPSULE ORAL
Qty: 14 | Refills: 0 | Status: ACTIVE | COMMUNITY
Start: 2024-08-29 | End: 1900-01-01

## 2024-08-29 NOTE — REASON FOR VISIT
[de-identified] : 1.  Percutaneous placement of right thoracic spinal cord stimulator electrode array. 2. Percutaneous placement of left thoracic spinal cord stimulator electrode array. 3.  Placementr of battery pulse generator. 4.  Complex programming of battery pulse generator.  5.  [employer] System. [de-identified] : 08/13/2024 [de-identified] : 16 [de-identified] : 2 [de-identified] : The patient stated that the incisional pain is better.  He does remain with soreness at the battery site.  He was to meet with the [employer] representative; however, the representative did not come to the office.  The patient called the representative and he will meet the patient at his home this morning.

## 2024-08-29 NOTE — HISTORY OF PRESENT ILLNESS
[FreeTextEntry1] : NANI FRANCIS is a 64- year -old gentleman who was involved with a work related injury in 2014.  At that time, he was working as a  and sheetrock fell on his head, injuring his neck and his back.  Subsequently, he underwent a cervical fusion, as well as two lumbar operations and fusions, the most recent of which was a little over a year ago.  He continues with neck pain but his main problem is chronic back pain that radiates into his lower extremities and his calves.  He had multiple modalities of treatment, including physical therapy, which has not helped.  The patient had a spinal cord stimulator trial with a [employer] system which gave him approximately 50% improvement of his pain.  He underwent permanent placement of a [employer] spinal cord stimulator on 08/13/2024.

## 2024-08-29 NOTE — ASSESSMENT
[FreeTextEntry1] : Juaquin Saenz is a 64 -year -old gentleman who underwent permanent placement of a [employer] stimulator on 08/13/2024.  He was to meet with the [employer] Representative for education about the stimulator and complex programming of his stimulator.  This was rescheduled to be done at his home this evening.  The wounds were evaluated.  The thoracic wound has a slightly open, dry incision however, there is some redness and swelling around the incision.  The wound was cleaned with betadine and then alcohol swabs.  Bacitracin and a ga7uze was applied.  The Mylicon sutures to the left buttock incision were clipped.  The buttock incision is clean and dry with a thin scab.  Wound care was discussed.  The patient is to shower once a day and is to apply a thin coating of bacitracin to the thoracic incision twice a day and leave the incision uncovered.  He was provided with a prescription for Keflex 500 mg capsules to take 2 x day x 7 days.   Activity levels were discussed.  The patient is to avoid bending, lifting and twisting.    He is to have a telehealth appointment in one week to check his wound and is to call the office if the redness does not improve or if he develops drainage.

## 2024-08-29 NOTE — PHYSICAL EXAM
[General Appearance - Alert] : alert [General Appearance - In No Acute Distress] : in no acute distress [General Appearance - Well Nourished] : well nourished [General Appearance - Well Developed] : well developed [General Appearance - Well-Appearing] : healthy appearing [] : normal voice and communication [Clean] : clean [Dry] : dry [Healing Well] : healing well [Intact] : intact [No Drainage] : without drainage [Normal Skin] : normal [Erythema] : erythematous [Upper Portion] : upper portion [Normal Skin Turgor] : skin turgor was normal [FreeTextEntry1] : of the thoracic incision.

## 2024-09-05 ENCOUNTER — APPOINTMENT (OUTPATIENT)
Dept: SPINE | Facility: CLINIC | Age: 64
End: 2024-09-05
Payer: MEDICARE

## 2024-09-05 DIAGNOSIS — M54.50 LOW BACK PAIN, UNSPECIFIED: ICD-10-CM

## 2024-09-05 DIAGNOSIS — G89.29 LOW BACK PAIN, UNSPECIFIED: ICD-10-CM

## 2024-09-05 PROCEDURE — 99442: CPT

## 2024-09-23 ENCOUNTER — APPOINTMENT (OUTPATIENT)
Dept: RADIOLOGY | Facility: CLINIC | Age: 64
End: 2024-09-23
Payer: OTHER MISCELLANEOUS

## 2024-09-23 PROCEDURE — 72040 X-RAY EXAM NECK SPINE 2-3 VW: CPT

## 2024-09-23 PROCEDURE — 72100 X-RAY EXAM L-S SPINE 2/3 VWS: CPT

## 2024-09-28 ENCOUNTER — APPOINTMENT (OUTPATIENT)
Dept: RADIOLOGY | Facility: CLINIC | Age: 64
End: 2024-09-28

## 2024-09-28 PROCEDURE — 72082 X-RAY EXAM ENTIRE SPI 2/3 VW: CPT

## 2024-09-30 DIAGNOSIS — S32.010A WEDGE COMPRESSION FRACTURE OF FIRST LUMBAR VERTEBRA, INITIAL ENCOUNTER FOR CLOSED FRACTURE: ICD-10-CM

## 2025-02-07 ENCOUNTER — OUTPATIENT (OUTPATIENT)
Dept: OUTPATIENT SERVICES | Facility: HOSPITAL | Age: 65
LOS: 1 days | Discharge: HOME | End: 2025-02-07
Payer: COMMERCIAL

## 2025-02-07 ENCOUNTER — TRANSCRIPTION ENCOUNTER (OUTPATIENT)
Age: 65
End: 2025-02-07

## 2025-02-07 DIAGNOSIS — Z98.890 OTHER SPECIFIED POSTPROCEDURAL STATES: Chronic | ICD-10-CM

## 2025-02-07 DIAGNOSIS — Z98.1 ARTHRODESIS STATUS: Chronic | ICD-10-CM

## 2025-02-07 DIAGNOSIS — M54.16 RADICULOPATHY, LUMBAR REGION: ICD-10-CM

## 2025-02-07 DIAGNOSIS — Z98.89 OTHER SPECIFIED POSTPROCEDURAL STATES: Chronic | ICD-10-CM

## 2025-02-07 LAB — GLUCOSE BLDC GLUCOMTR-MCNC: 124 MG/DL — HIGH (ref 70–99)

## 2025-02-07 PROCEDURE — 76000 FLUOROSCOPY <1 HR PHYS/QHP: CPT

## 2025-02-07 PROCEDURE — 64483 NJX AA&/STRD TFRM EPI L/S 1: CPT | Mod: 50

## 2025-02-07 PROCEDURE — 82962 GLUCOSE BLOOD TEST: CPT

## 2025-03-26 ENCOUNTER — APPOINTMENT (OUTPATIENT)
Dept: PAIN MANAGEMENT | Facility: CLINIC | Age: 65
End: 2025-03-26
Payer: OTHER MISCELLANEOUS

## 2025-03-26 DIAGNOSIS — M54.12 RADICULOPATHY, CERVICAL REGION: ICD-10-CM

## 2025-03-26 PROCEDURE — 99204 OFFICE O/P NEW MOD 45 MIN: CPT

## 2025-03-26 RX ORDER — GABAPENTIN 300 MG
300 TABLET ORAL
Refills: 0 | Status: ACTIVE | COMMUNITY

## 2025-03-26 RX ORDER — TIZANIDINE HYDROCHLORIDE 2 MG/1
2 CAPSULE ORAL
Refills: 0 | Status: ACTIVE | COMMUNITY

## 2025-04-18 ENCOUNTER — APPOINTMENT (OUTPATIENT)
Dept: PAIN MANAGEMENT | Facility: CLINIC | Age: 65
End: 2025-04-18
Payer: OTHER MISCELLANEOUS

## 2025-04-18 DIAGNOSIS — M54.12 RADICULOPATHY, CERVICAL REGION: ICD-10-CM

## 2025-04-18 PROCEDURE — 99213 OFFICE O/P EST LOW 20 MIN: CPT | Mod: 95

## 2025-05-20 ENCOUNTER — APPOINTMENT (OUTPATIENT)
Dept: PAIN MANAGEMENT | Facility: CLINIC | Age: 65
End: 2025-05-20
Payer: OTHER MISCELLANEOUS

## 2025-05-20 DIAGNOSIS — M54.12 RADICULOPATHY, CERVICAL REGION: ICD-10-CM

## 2025-05-20 PROCEDURE — 99213 OFFICE O/P EST LOW 20 MIN: CPT

## 2025-05-20 RX ORDER — GABAPENTIN 300 MG/1
300 CAPSULE ORAL 3 TIMES DAILY
Qty: 90 | Refills: 5 | Status: ACTIVE | COMMUNITY
Start: 2025-05-20 | End: 1900-01-01

## 2025-05-20 RX ORDER — TIZANIDINE 2 MG/1
2 TABLET ORAL
Qty: 180 | Refills: 5 | Status: ACTIVE | COMMUNITY
Start: 2025-05-20 | End: 1900-01-01

## 2025-06-01 NOTE — ED PROVIDER NOTE - CARDIAC, MLM
Wound cleansed with Hibiclens and irrigated with saline. Pre provider bleeding controled with pressure dressing . Pt tolerated well  
Wound dressed post repair with non adherent and Jackie with bulk guaze. Pt tolerated well  
Normal rate, regular rhythm.  Heart sounds S1, S2.  No murmurs, rubs or gallops.

## 2025-06-20 ENCOUNTER — APPOINTMENT (OUTPATIENT)
Dept: PAIN MANAGEMENT | Facility: CLINIC | Age: 65
End: 2025-06-20
Payer: OTHER MISCELLANEOUS

## 2025-06-20 PROCEDURE — 99213 OFFICE O/P EST LOW 20 MIN: CPT | Mod: 95

## 2025-07-16 ENCOUNTER — APPOINTMENT (OUTPATIENT)
Dept: PAIN MANAGEMENT | Facility: CLINIC | Age: 65
End: 2025-07-16
Payer: OTHER MISCELLANEOUS

## 2025-07-16 PROCEDURE — 99213 OFFICE O/P EST LOW 20 MIN: CPT | Mod: 93

## 2025-08-12 ENCOUNTER — APPOINTMENT (OUTPATIENT)
Dept: PAIN MANAGEMENT | Facility: CLINIC | Age: 65
End: 2025-08-12
Payer: OTHER MISCELLANEOUS

## 2025-08-12 VITALS — WEIGHT: 215 LBS | BODY MASS INDEX: 33.74 KG/M2 | HEIGHT: 67 IN

## 2025-08-12 DIAGNOSIS — M54.16 RADICULOPATHY, LUMBAR REGION: ICD-10-CM

## 2025-08-12 PROCEDURE — 99214 OFFICE O/P EST MOD 30 MIN: CPT

## 2025-08-22 ENCOUNTER — TRANSCRIPTION ENCOUNTER (OUTPATIENT)
Age: 65
End: 2025-08-22

## 2025-08-25 ENCOUNTER — OUTPATIENT (OUTPATIENT)
Dept: OUTPATIENT SERVICES | Facility: HOSPITAL | Age: 65
LOS: 1 days | End: 2025-08-25
Payer: COMMERCIAL

## 2025-08-25 ENCOUNTER — APPOINTMENT (OUTPATIENT)
Dept: ORTHOPEDIC SURGERY | Facility: HOSPITAL | Age: 65
End: 2025-08-25
Payer: OTHER MISCELLANEOUS

## 2025-08-25 VITALS
HEART RATE: 65 BPM | SYSTOLIC BLOOD PRESSURE: 151 MMHG | RESPIRATION RATE: 16 BRPM | WEIGHT: 214.95 LBS | HEIGHT: 68 IN | TEMPERATURE: 98 F | OXYGEN SATURATION: 98 % | DIASTOLIC BLOOD PRESSURE: 90 MMHG

## 2025-08-25 VITALS
HEART RATE: 65 BPM | OXYGEN SATURATION: 98 % | RESPIRATION RATE: 20 BRPM | DIASTOLIC BLOOD PRESSURE: 75 MMHG | SYSTOLIC BLOOD PRESSURE: 127 MMHG

## 2025-08-25 DIAGNOSIS — M54.16 RADICULOPATHY, LUMBAR REGION: ICD-10-CM

## 2025-08-25 DIAGNOSIS — Z98.890 OTHER SPECIFIED POSTPROCEDURAL STATES: Chronic | ICD-10-CM

## 2025-08-25 DIAGNOSIS — Z98.89 OTHER SPECIFIED POSTPROCEDURAL STATES: Chronic | ICD-10-CM

## 2025-08-25 DIAGNOSIS — Z98.1 ARTHRODESIS STATUS: Chronic | ICD-10-CM

## 2025-08-25 LAB — GLUCOSE BLDC GLUCOMTR-MCNC: 118 MG/DL — HIGH (ref 70–99)

## 2025-08-25 PROCEDURE — 62323 NJX INTERLAMINAR LMBR/SAC: CPT

## 2025-08-25 PROCEDURE — 82962 GLUCOSE BLOOD TEST: CPT

## 2025-08-25 RX ORDER — CLOPIDOGREL BISULFATE 75 MG/1
1 TABLET, FILM COATED ORAL
Refills: 0 | DISCHARGE

## 2025-09-10 ENCOUNTER — APPOINTMENT (OUTPATIENT)
Dept: PAIN MANAGEMENT | Facility: CLINIC | Age: 65
End: 2025-09-10
Payer: OTHER MISCELLANEOUS

## 2025-09-10 DIAGNOSIS — M54.16 RADICULOPATHY, LUMBAR REGION: ICD-10-CM

## 2025-09-10 PROCEDURE — 99213 OFFICE O/P EST LOW 20 MIN: CPT | Mod: ACP,93

## (undated) DEVICE — ELCTR BOVIE TIP NEEDLE IMA/ENT 3/4"

## (undated) DEVICE — TRAY EPIDURAL SINGLE DOSE

## (undated) DEVICE — PACK EXTREMITY

## (undated) DEVICE — DRSG WEBRIL 2"

## (undated) DEVICE — SOL INJ LR 500ML

## (undated) DEVICE — Device

## (undated) DEVICE — SOL IRR POUR H2O 1000ML

## (undated) DEVICE — STYLET  ENDOTRACH 7.5MM X 10MM

## (undated) DEVICE — WARMING BLANKET LOWER ADULT

## (undated) DEVICE — TUBING ALARIS PUMP MODULE NON-DEHP

## (undated) DEVICE — DRSG ACE BANDAGE 4"

## (undated) DEVICE — PREP CHLORAPREP HI-LITE ORANGE 26ML

## (undated) DEVICE — SOL IRR POUR NS 0.9% 1000ML

## (undated) DEVICE — GLV 8 PROTEXIS (CREAM) NEU-THERA

## (undated) DEVICE — PACK IV START WITH CHG

## (undated) DEVICE — GLV 8 PROTEXIS (WHITE)

## (undated) DEVICE — DRSG DERMABOND 0.7ML

## (undated) DEVICE — TUBING IV EXTENSION MACRO W CLAVE 7"

## (undated) DEVICE — CATH IV SAFE BC 22G X 1" (BLUE)

## (undated) DEVICE — NDL HYPO REGULAR BEVEL 25G X 1.5" (BLUE)

## (undated) DEVICE — TOURNIQUET ESMARK 4"

## (undated) DEVICE — SYR IV FLUSH SALINE 10ML 30/TY

## (undated) DEVICE — SUT MONOCRYL 4-0 27" PS-2 UNDYED

## (undated) DEVICE — FRAZIER SUCTION TIP 10FR

## (undated) DEVICE — ELCTR ENT BOVIE SUCTION 10FR 6"

## (undated) DEVICE — TOURNIQUET CUFF 18" DUAL PORT SINGLE BLADDER LUER LOCK (BLACK)

## (undated) DEVICE — NDL SPINAL 22G X 3.5" QUINCKE

## (undated) DEVICE — DRSG STERISTRIPS 0.5 X 4"

## (undated) DEVICE — DRAPE HAND 77" X 146"

## (undated) DEVICE — VENODYNE/SCD SLEEVE CALF MEDIUM